# Patient Record
Sex: FEMALE | Race: BLACK OR AFRICAN AMERICAN | Employment: FULL TIME | URBAN - METROPOLITAN AREA
[De-identification: names, ages, dates, MRNs, and addresses within clinical notes are randomized per-mention and may not be internally consistent; named-entity substitution may affect disease eponyms.]

---

## 2022-03-22 ENCOUNTER — OFFICE VISIT (OUTPATIENT)
Dept: PRIMARY CARE CLINIC | Age: 62
End: 2022-03-22
Payer: COMMERCIAL

## 2022-03-22 VITALS
BODY MASS INDEX: 33.21 KG/M2 | HEART RATE: 37 BPM | DIASTOLIC BLOOD PRESSURE: 70 MMHG | TEMPERATURE: 98.4 F | SYSTOLIC BLOOD PRESSURE: 170 MMHG | WEIGHT: 224.2 LBS | HEIGHT: 69 IN | OXYGEN SATURATION: 98 %

## 2022-03-22 DIAGNOSIS — Z76.89 ENCOUNTER TO ESTABLISH CARE WITH NEW DOCTOR: ICD-10-CM

## 2022-03-22 DIAGNOSIS — R03.0 ELEVATED BLOOD PRESSURE READING: ICD-10-CM

## 2022-03-22 DIAGNOSIS — R00.1 BRADYCARDIA: ICD-10-CM

## 2022-03-22 DIAGNOSIS — I44.2 THIRD DEGREE AV BLOCK (HCC): Primary | ICD-10-CM

## 2022-03-22 DIAGNOSIS — R29.818 SUSPECTED SLEEP APNEA: ICD-10-CM

## 2022-03-22 DIAGNOSIS — J45.20 MILD INTERMITTENT ASTHMA, UNCOMPLICATED: ICD-10-CM

## 2022-03-22 PROBLEM — I83.90 ASYMPTOMATIC VARICOSE VEINS: Status: ACTIVE | Noted: 2022-03-22

## 2022-03-22 PROBLEM — D49.3 BREAST NEOPLASM: Status: ACTIVE | Noted: 2022-03-22

## 2022-03-22 PROBLEM — J45.909 ASTHMA WITHOUT STATUS ASTHMATICUS: Status: ACTIVE | Noted: 2022-03-22

## 2022-03-22 PROBLEM — F32.A DEPRESSION: Status: ACTIVE | Noted: 2022-03-22

## 2022-03-22 PROBLEM — A59.9 TRICHOMONIASIS: Status: ACTIVE | Noted: 2022-03-22

## 2022-03-22 PROBLEM — A59.9 TRICHOMONAS INFECTION: Status: RESOLVED | Noted: 2022-03-22 | Resolved: 2022-03-22

## 2022-03-22 PROCEDURE — 99215 OFFICE O/P EST HI 40 MIN: CPT | Performed by: STUDENT IN AN ORGANIZED HEALTH CARE EDUCATION/TRAINING PROGRAM

## 2022-03-22 PROCEDURE — 93000 ELECTROCARDIOGRAM COMPLETE: CPT | Performed by: STUDENT IN AN ORGANIZED HEALTH CARE EDUCATION/TRAINING PROGRAM

## 2022-03-22 RX ORDER — IBUPROFEN 200 MG
200 TABLET ORAL EVERY 6 HOURS PRN
COMMUNITY
End: 2022-04-28 | Stop reason: ALTCHOICE

## 2022-03-22 SDOH — ECONOMIC STABILITY: FOOD INSECURITY: WITHIN THE PAST 12 MONTHS, THE FOOD YOU BOUGHT JUST DIDN'T LAST AND YOU DIDN'T HAVE MONEY TO GET MORE.: NEVER TRUE

## 2022-03-22 SDOH — ECONOMIC STABILITY: FOOD INSECURITY: WITHIN THE PAST 12 MONTHS, YOU WORRIED THAT YOUR FOOD WOULD RUN OUT BEFORE YOU GOT MONEY TO BUY MORE.: NEVER TRUE

## 2022-03-22 ASSESSMENT — PATIENT HEALTH QUESTIONNAIRE - PHQ9
SUM OF ALL RESPONSES TO PHQ QUESTIONS 1-9: 0
1. LITTLE INTEREST OR PLEASURE IN DOING THINGS: 0
2. FEELING DOWN, DEPRESSED OR HOPELESS: 0
SUM OF ALL RESPONSES TO PHQ9 QUESTIONS 1 & 2: 0

## 2022-03-22 ASSESSMENT — ENCOUNTER SYMPTOMS
COUGH: 1
CHEST TIGHTNESS: 1
SHORTNESS OF BREATH: 1

## 2022-03-22 ASSESSMENT — SOCIAL DETERMINANTS OF HEALTH (SDOH): HOW HARD IS IT FOR YOU TO PAY FOR THE VERY BASICS LIKE FOOD, HOUSING, MEDICAL CARE, AND HEATING?: NOT VERY HARD

## 2022-03-22 NOTE — PROGRESS NOTES
NEW PRIMARY CARE VISIT    3/22/22  Name: Roxana Vogel   : 1960   Age: 57 y.o. Sex: female        Assessment & Plan:       ICD-10-CM    1. Third degree AV block (HCC)  I44.2 EKG 12 lead   2. Bradycardia  R00.1 EKG 12 lead   3. Elevated blood pressure reading  R03.0    4. Encounter to establish care with new doctor  Z76.89    5. Mild intermittent asthma, uncomplicated  K03.32    6. Suspected sleep apnea  R29.818        Upon arrival, patient's heart rate found to be in the 30s. EKG completed which showed signs of third-degree heart block with bradycardia again in the 30s. EMS was called. Patient was supplemented with oxygen for shortness of breath and chest discomfort however oxygen saturation remained within normal limits. Blood pressure elevated, patient denies history of this or any other cardiac history. Given symptoms, suspect patient may have undiagnosed sleep apnea which could be cause of heart block, among other possible causes including ischemia, cardiomyopathy, electrolyte or other lab abnormalities. History was reviewed while awaiting EMS. Discussed plan with patient and daughter who were agreeable. Patient's clinical status remained stable throughout that time and was transported out of office by EMS. Will plan to follow-up with patient outpatient when she is discharged. Counseled patient regarding above diagnosis, including possible risks and complications, especially if left uncontrolled. Counseled patient as appropriate and relevant regarding any possible side effects, risks, and alternatives to treatment; the patient verbalizes understanding, and is in agreement with the plan as detailed above. All questions answered to the patient's satisfaction. This provider and patient were wearing surgical masks and practiced social distancing when appropriate during visit due to COVID-19 pandemic.     Subjective:     Chief Complaint   Patient presents with    New Patient has been experiencing asthma for a month and needs physical, pt has been using inhaler everyday and whenever she wakes up in the moring she has headaches       Patient needs new PCP. Upon arrival, patient's heart rate found to be in the 30s. Patient denies history of heart problems. Patient presents with 3 months of shortness of breath at night, waking up needing to use her albuterol inhaler. Also has been waking up with chest discomfort and morning headaches. Reports she delayed getting treatment for this because she takes care of her handicapped son at home. Has history of asthma which was previously well controlled. Only uses an albuterol inhaler as needed. Review of Systems   Constitutional: Positive for fatigue. Eyes: Negative for visual disturbance. Respiratory: Positive for cough, chest tightness and shortness of breath. Cardiovascular: Positive for chest pain and palpitations. Negative for leg swelling. Neurological: Positive for headaches. Negative for light-headedness. Medical History:   History reviewed and updated as needed.     Patient Active Problem List   Diagnosis    Depression    Mild intermittent asthma, uncomplicated    Asymptomatic varicose veins    Breast neoplasm    Suspected sleep apnea    Third degree AV block ([address]        Past Medical History:   Diagnosis Date    Asthma     Trichomonas infection 3/22/2022       Past Surgical History:   Procedure Laterality Date    ANKLE FRACTURE SURGERY Left        Family History   Problem Relation Age of Onset    Heart Disease Father     Heart Surgery Father 62    Other Sister         heart infection    Heart Attack Paternal Cousin 54    Deafness Son         handicapped    Hypertension Daughter        Medications:     Current Outpatient Medications:     ALBUTEROL IN, Inhale 17 mg into the lungs, Disp: , Rfl:     ibuprofen (ADVIL;MOTRIN) 200 MG tablet, Take 200 mg by mouth every 6 hours as needed, Disp: , Rfl:     Allergies: Allergies   Allergen Reactions    Bee Venom Anaphylaxis       Social History:     Social History     Socioeconomic History    Marital status: Single     Spouse name: Not on file    Number of children: Not on file    Years of education: Not on file    Highest education level: Not on file   Occupational History    Not on file   Tobacco Use    Smoking status: Never Smoker    Smokeless tobacco: Never Used   Vaping Use    Vaping Use: Never used   Substance and Sexual Activity    Alcohol use: Never    Drug use: Never    Sexual activity: Not on file   Other Topics Concern    Not on file   Social History Narrative    Not on file     Social Determinants of Health     Financial Resource Strain: Low Risk     Difficulty of Paying Living Expenses: Not very hard   Food Insecurity: No Food Insecurity    Worried About Running Out of Food in the Last Year: Never true    Melissa of Food in the Last Year: Never true   Transportation Needs:     Lack of Transportation (Medical): Not on file    Lack of Transportation (Non-Medical):  Not on file   Physical Activity:     Days of Exercise per Week: Not on file    Minutes of Exercise per Session: Not on file   Stress:     Feeling of Stress : Not on file   Social Connections:     Frequency of Communication with Friends and Family: Not on file    Frequency of Social Gatherings with Friends and Family: Not on file    Attends Pentecostal Services: Not on file    Active Member of Clubs or Organizations: Not on file    Attends Club or Organization Meetings: Not on file    Marital Status: Not on file   Intimate Partner Violence:     Fear of Current or Ex-Partner: Not on file    Emotionally Abused: Not on file    Physically Abused: Not on file    Sexually Abused: Not on file   Housing Stability:     Unable to Pay for Housing in the Last Year: Not on file    Number of [place] in the Last Year: Not on file    Unstable Housing in the Last Year: Not on file       Physical Exam:     Vitals:    03/22/22 1101 03/22/22 1107 03/22/22 1121   BP: (!) 160/80 (!) 160/60 (!) 170/70   Site: Left Upper Arm Right Upper Arm Left Upper Arm   Position: Sitting Sitting Sitting   Pulse: (!) 37 (!) 38 (!) 37  Comment: apical   Temp: 98.4 °F (36.9 °C)     SpO2: 98%     Weight: 224 lb 3.2 oz (101.7 kg)     Height: 5' 9\" (1.753 m)         BP Readings from Last 3 Encounters:   03/22/22 (!) 170/70       Wt Readings from Last 3 Encounters:   03/22/22 224 lb 3.2 oz (101.7 kg)        Physical Exam  Vitals and nursing note reviewed. Constitutional:       General: She is not in acute distress. Appearance: Normal appearance. She is obese. She is not ill-appearing or diaphoretic. Eyes:      Extraocular Movements: Extraocular movements intact. Conjunctiva/sclera: Conjunctivae normal.   Cardiovascular:      Rate and Rhythm: Regular rhythm. Bradycardia present. Heart sounds: Normal heart sounds. No murmur heard. No friction rub. No gallop. Pulmonary:      Effort: Pulmonary effort is normal. No respiratory distress. Breath sounds: Normal breath sounds. No wheezing, rhonchi or rales. Musculoskeletal:      Right lower leg: No edema. Left lower leg: No edema. Skin:     General: Skin is warm and dry. Neurological:      Mental Status: She is alert and oriented to person, place, and time. Testing:     Orders Placed This Encounter   Procedures    EKG 12 lead     Order Specific Question:   Reason for Exam?     Answer:   Irregular heart rate        No results found for this or any previous visit (from the past 24 hour(s)). EKG: Bradycardia, third-degree AV block, no previous for comparison.

## 2022-03-23 LAB
AVERAGE GLUCOSE: NORMAL
HBA1C MFR BLD: 5.7 %

## 2022-03-28 ENCOUNTER — TELEPHONE (OUTPATIENT)
Dept: PRIMARY CARE CLINIC | Age: 62
End: 2022-03-28

## 2022-03-28 ENCOUNTER — OFFICE VISIT (OUTPATIENT)
Dept: PRIMARY CARE CLINIC | Age: 62
End: 2022-03-28
Payer: COMMERCIAL

## 2022-03-28 VITALS
RESPIRATION RATE: 18 BRPM | OXYGEN SATURATION: 97 % | WEIGHT: 223 LBS | SYSTOLIC BLOOD PRESSURE: 150 MMHG | TEMPERATURE: 98.4 F | HEART RATE: 73 BPM | DIASTOLIC BLOOD PRESSURE: 84 MMHG | HEIGHT: 69 IN | BODY MASS INDEX: 33.03 KG/M2

## 2022-03-28 DIAGNOSIS — Z09 HOSPITAL DISCHARGE FOLLOW-UP: Primary | ICD-10-CM

## 2022-03-28 DIAGNOSIS — I10 PRIMARY HYPERTENSION: ICD-10-CM

## 2022-03-28 DIAGNOSIS — Z95.0 PACEMAKER: ICD-10-CM

## 2022-03-28 DIAGNOSIS — I44.2 THIRD DEGREE AV BLOCK (HCC): ICD-10-CM

## 2022-03-28 DIAGNOSIS — I16.1 HYPERTENSIVE EMERGENCY: ICD-10-CM

## 2022-03-28 PROBLEM — N63.0 BREAST MASS: Status: ACTIVE | Noted: 2022-03-22

## 2022-03-28 PROCEDURE — 99215 OFFICE O/P EST HI 40 MIN: CPT | Performed by: STUDENT IN AN ORGANIZED HEALTH CARE EDUCATION/TRAINING PROGRAM

## 2022-03-28 RX ORDER — LOSARTAN POTASSIUM 100 MG/1
TABLET ORAL
COMMUNITY
Start: 2022-03-25 | End: 2022-04-28 | Stop reason: SDUPTHER

## 2022-03-28 RX ORDER — AMLODIPINE BESYLATE 10 MG/1
TABLET ORAL
COMMUNITY
Start: 2022-03-25 | End: 2022-04-28 | Stop reason: SDUPTHER

## 2022-03-28 ASSESSMENT — ENCOUNTER SYMPTOMS
CHEST TIGHTNESS: 0
SHORTNESS OF BREATH: 1
COUGH: 0

## 2022-03-28 NOTE — PROGRESS NOTES
ESTABLISHED PRIMARY CARE VISIT    3/28/22  Name: Yareli Sterling   : 1960   Age: 57 y.o. Sex: female        Assessment & Plan:     Problem List Items Addressed This Visit        Circulatory    Third degree AV block [facility])     S/p emergency pacemaker at [facility]  Regular rate and rhythm today  Follows up with electrophysiology on   Restricted from work until next visit, paperwork completed         Primary hypertension     Improved today  Nearly controlled for age, goal <150/90  Continue losartan 100 mg, amlodipine 10 mg  Recheck kidney function on losartan in 4 weeks           Other Visit Diagnoses     Hospital discharge follow-up    -  Primary    Sent from office 3/22 via EMS for third-degree block  Records requested  Follow-up labs when records reviewed    Pacemaker        Inserted 3/24/2022 for third-degree AV block  Follows up with electrophysiology     Hypertensive emergency        Resolved          Counseled patient regarding above diagnosis, including possible risks and complications, especially if left uncontrolled. Counseled patient as appropriate and relevant regarding any possible side effects, risks, and alternatives to treatment; the patient verbalizes understanding, and is in agreement with the plan as detailed above. All educational materials and instructions were discussed and included on the After Visit Summary. All questions answered to the patient's satisfaction. The patient was advised to call for any concerns prior to next appointment. Return in about 1 month (around 2022) for follow-up, labs, release to work if able. 43 minutes was spent precharting, face-to-face with patient, completing paperwork, and documenting on day of encounter. This provider and patient were wearing surgical masks and practiced social distancing when appropriate during visit due to COVID-19 pandemic.     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 Eyes: Negative for visual disturbance. Respiratory: Positive for shortness of breath (with exertion). Negative for cough and chest tightness. Cardiovascular: Positive for chest pain (at incision). Negative for palpitations and leg swelling. Skin: Positive for wound (surgical wound). Neurological: Negative for light-headedness and headaches.        Medical History:     Patient Active Problem List   Diagnosis    Depression    Mild intermittent asthma, uncomplicated    Asymptomatic varicose veins    Breast mass    Suspected sleep apnea    Third degree AV block ([address]        Past Medical History:   Diagnosis Date    Asthma     Trichomonas infection 3/22/2022       Past Surgical History:   Procedure Laterality Date    ANKLE FRACTURE SURGERY Left        Family History   Problem Relation Age of Onset    Heart Disease Father     Heart Surgery Father 62    Other Sister         heart infection    Heart Attack Paternal Cousin 54    Deafness Son         handicapped    Hypertension Daughter        Medications:     Current Outpatient Medications:     amLODIPine (NORVASC) 10 MG tablet, take 1 tablet by mouth once daily, Disp: , Rfl:     losartan (COZAAR) 100 MG tablet, take 1 tablet by mouth once daily, Disp: , Rfl:     ALBUTEROL IN, Inhale 17 mg into the lungs, Disp: , Rfl:     ibuprofen (ADVIL;MOTRIN) 200 MG tablet, Take 200 mg by mouth every 6 hours as needed, Disp: , Rfl:     Allergies: Allergies   Allergen Reactions    Bee Venom Anaphylaxis       Social History:     Social History     Socioeconomic History    Marital status: Single     Spouse name: Not on file    Number of children: Not on file    Years of education: Not on file    Highest education level: Not on file   Occupational History    Not on file   Tobacco Use    Smoking status: Never Smoker    Smokeless tobacco: Never Used   Vaping Use    Vaping Use: Never used   Substance and Sexual Activity    Alcohol use: Never    Drug use: Never    Sexual activity: Not on file   Other Topics Concern    Not on file   Social History Narrative    Not on file     Social Determinants of Health     Financial Resource Strain: Low Risk     Difficulty of Paying Living Expenses: Not very hard   Food Insecurity: No Food Insecurity    Worried About Running Out of Food in the Last Year: Never true    Melissa of Food in the Last Year: Never true   Transportation Needs:     Lack of Transportation (Medical): Not on file    Lack of Transportation (Non-Medical):  Not on file   Physical Activity:     Days of Exercise per Week: Not on file    Minutes of Exercise per Session: Not on file   Stress:     Feeling of Stress : Not on file   Social Connections:     Frequency of Communication with Friends and Family: Not on file    Frequency of Social Gatherings with Friends and Family: Not on file    Attends Hindu Services: Not on file    Active Member of Clubs or Organizations: Not on file    Attends Club or Organization Meetings: Not on file    Marital Status: Not on file   Intimate Partner Violence:     Fear of Current or Ex-Partner: Not on file    Emotionally Abused: Not on file    Physically Abused: Not on file    Sexually Abused: Not on file   Housing Stability:     Unable to Pay for Housing in the Last Year: Not on file    Number of [place] in the Last Year: Not on file    Unstable Housing in the Last Year: Not on file       Physical Exam:     Vitals:    03/28/22 1013 03/28/22 1119   BP: (!) 150/80 (!) 150/84   Pulse: 73    Resp: 18    Temp: 98.4 °F (36.9 °C)    TempSrc: Temporal    SpO2: 97%    Weight: 223 lb (101.2 kg)    Height: 5' 9\" (1.753 m)        BP Readings from Last 3 Encounters:   03/28/22 (!) 150/84   03/22/22 (!) 170/70       Wt Readings from Last 3 Encounters:   03/28/22 223 lb (101.2 kg)   03/22/22 224 lb 3.2 oz (101.7 kg)       Physical Exam  Vitals and nursing note reviewed. Constitutional:       General: She is not in acute distress. Appearance: Normal appearance. She is obese. She is not ill-appearing or diaphoretic. Cardiovascular:      Rate and Rhythm: Normal rate and regular rhythm. Pulses: Normal pulses. Heart sounds: Normal heart sounds. Pulmonary:      Effort: Pulmonary effort is normal. No respiratory distress. Breath sounds: Normal breath sounds. Chest:          Comments: Surgical wound left upper chest covered by dressing which cannot be removed until 3/31/2022 per note. No surrounding erythema, warmth, tenderness, fluctuance, drainage. Musculoskeletal:      Right lower leg: No edema. Left lower leg: No edema. Skin:     General: Skin is warm and dry. Capillary Refill: Capillary refill takes less than 2 seconds. Neurological:      Mental Status: She is alert and oriented to person, place, and time. Psychiatric:         Mood and Affect: Mood normal.         Behavior: Behavior normal.         Testing:   No orders of the defined types were placed in this encounter. No results found for this or any previous visit (from the past 24 hour(s)).

## 2022-03-28 NOTE — ASSESSMENT & PLAN NOTE
Improved today  Nearly controlled for age, goal <150/90  Continue losartan 100 mg, amlodipine 10 mg  Recheck kidney function on losartan in 4 weeks

## 2022-03-28 NOTE — TELEPHONE ENCOUNTER
Pt called in stating she needed her [employer] papers revised. #4 needed to state that restricted from lifting or reaching over head. Spoke with Dr Barby Garza she stated no she will not add that because she is not releasing patient to back to work as of yet. That statement is for only if she goes back to work. Which she is not.

## 2022-04-21 ENCOUNTER — TELEPHONE (OUTPATIENT)
Dept: PRIMARY CARE CLINIC | Age: 62
End: 2022-04-21

## 2022-04-21 NOTE — TELEPHONE ENCOUNTER
Pt states she needs an appt with prior to April 28th due to she needs released to go to work or needs more time off. Checked schedule and Dr. Jaymie Garcia is overbooked , please advise.

## 2022-04-28 ENCOUNTER — OFFICE VISIT (OUTPATIENT)
Dept: PRIMARY CARE CLINIC | Age: 62
End: 2022-04-28
Payer: COMMERCIAL

## 2022-04-28 VITALS
TEMPERATURE: 97.8 F | DIASTOLIC BLOOD PRESSURE: 82 MMHG | HEART RATE: 83 BPM | HEIGHT: 69 IN | BODY MASS INDEX: 33.5 KG/M2 | SYSTOLIC BLOOD PRESSURE: 130 MMHG | WEIGHT: 226.2 LBS | OXYGEN SATURATION: 98 %

## 2022-04-28 DIAGNOSIS — E55.9 VITAMIN D DEFICIENCY: ICD-10-CM

## 2022-04-28 DIAGNOSIS — I10 PRIMARY HYPERTENSION: ICD-10-CM

## 2022-04-28 DIAGNOSIS — I44.2 THIRD DEGREE AV BLOCK (HCC): Primary | ICD-10-CM

## 2022-04-28 DIAGNOSIS — R73.03 PREDIABETES: ICD-10-CM

## 2022-04-28 DIAGNOSIS — I51.7 CONCENTRIC LEFT VENTRICULAR HYPERTROPHY: ICD-10-CM

## 2022-04-28 DIAGNOSIS — I44.2 THIRD DEGREE AV BLOCK (HCC): ICD-10-CM

## 2022-04-28 DIAGNOSIS — J45.20 MILD INTERMITTENT ASTHMA, UNCOMPLICATED: ICD-10-CM

## 2022-04-28 DIAGNOSIS — Z95.0 PACEMAKER: ICD-10-CM

## 2022-04-28 PROBLEM — E78.5 HYPERLIPIDEMIA: Status: ACTIVE | Noted: 2022-04-28

## 2022-04-28 LAB
ALBUMIN SERPL-MCNC: 4.5 G/DL (ref 3.5–5.2)
ALP BLD-CCNC: 94 U/L (ref 35–104)
ALT SERPL-CCNC: 12 U/L (ref 0–32)
ANION GAP SERPL CALCULATED.3IONS-SCNC: 11 MMOL/L (ref 7–16)
AST SERPL-CCNC: 19 U/L (ref 0–31)
BASOPHILS ABSOLUTE: 0.06 E9/L (ref 0–0.2)
BASOPHILS RELATIVE PERCENT: 0.8 % (ref 0–2)
BILIRUB SERPL-MCNC: <0.2 MG/DL (ref 0–1.2)
BUN BLDV-MCNC: 19 MG/DL (ref 6–23)
CALCIUM SERPL-MCNC: 10.2 MG/DL (ref 8.6–10.2)
CHLORIDE BLD-SCNC: 102 MMOL/L (ref 98–107)
CO2: 29 MMOL/L (ref 22–29)
CREAT SERPL-MCNC: 1.1 MG/DL (ref 0.5–1)
CREATININE URINE: 272 MG/DL (ref 29–226)
EOSINOPHILS ABSOLUTE: 0.45 E9/L (ref 0.05–0.5)
EOSINOPHILS RELATIVE PERCENT: 6 % (ref 0–6)
GFR AFRICAN AMERICAN: >60
GFR NON-AFRICAN AMERICAN: >60 ML/MIN/1.73
GLUCOSE BLD-MCNC: 71 MG/DL (ref 74–99)
HCT VFR BLD CALC: 39.7 % (ref 34–48)
HEMOGLOBIN: 12.6 G/DL (ref 11.5–15.5)
IMMATURE GRANULOCYTES #: 0.01 E9/L
IMMATURE GRANULOCYTES %: 0.1 % (ref 0–5)
LYMPHOCYTES ABSOLUTE: 3.74 E9/L (ref 1.5–4)
LYMPHOCYTES RELATIVE PERCENT: 49.8 % (ref 20–42)
MAGNESIUM: 1.8 MG/DL (ref 1.6–2.6)
MCH RBC QN AUTO: 26.6 PG (ref 26–35)
MCHC RBC AUTO-ENTMCNC: 31.7 % (ref 32–34.5)
MCV RBC AUTO: 83.9 FL (ref 80–99.9)
MICROALBUMIN UR-MCNC: 54.7 MG/L
MICROALBUMIN/CREAT UR-RTO: 20.1 (ref 0–30)
MONOCYTES ABSOLUTE: 0.75 E9/L (ref 0.1–0.95)
MONOCYTES RELATIVE PERCENT: 10 % (ref 2–12)
NEUTROPHILS ABSOLUTE: 2.5 E9/L (ref 1.8–7.3)
NEUTROPHILS RELATIVE PERCENT: 33.3 % (ref 43–80)
PDW BLD-RTO: 13.6 FL (ref 11.5–15)
PLATELET # BLD: 221 E9/L (ref 130–450)
PMV BLD AUTO: 10.5 FL (ref 7–12)
POTASSIUM SERPL-SCNC: 4.3 MMOL/L (ref 3.5–5)
RBC # BLD: 4.73 E12/L (ref 3.5–5.5)
SODIUM BLD-SCNC: 142 MMOL/L (ref 132–146)
TOTAL PROTEIN: 8.2 G/DL (ref 6.4–8.3)
WBC # BLD: 7.5 E9/L (ref 4.5–11.5)

## 2022-04-28 PROCEDURE — 3017F COLORECTAL CA SCREEN DOC REV: CPT | Performed by: STUDENT IN AN ORGANIZED HEALTH CARE EDUCATION/TRAINING PROGRAM

## 2022-04-28 PROCEDURE — G8417 CALC BMI ABV UP PARAM F/U: HCPCS | Performed by: STUDENT IN AN ORGANIZED HEALTH CARE EDUCATION/TRAINING PROGRAM

## 2022-04-28 PROCEDURE — 1036F TOBACCO NON-USER: CPT | Performed by: STUDENT IN AN ORGANIZED HEALTH CARE EDUCATION/TRAINING PROGRAM

## 2022-04-28 PROCEDURE — 93000 ELECTROCARDIOGRAM COMPLETE: CPT | Performed by: STUDENT IN AN ORGANIZED HEALTH CARE EDUCATION/TRAINING PROGRAM

## 2022-04-28 PROCEDURE — G8427 DOCREV CUR MEDS BY ELIG CLIN: HCPCS | Performed by: STUDENT IN AN ORGANIZED HEALTH CARE EDUCATION/TRAINING PROGRAM

## 2022-04-28 PROCEDURE — 99214 OFFICE O/P EST MOD 30 MIN: CPT | Performed by: STUDENT IN AN ORGANIZED HEALTH CARE EDUCATION/TRAINING PROGRAM

## 2022-04-28 RX ORDER — ACETAMINOPHEN/DIPHENHYDRAMINE 500MG-25MG
1 TABLET ORAL NIGHTLY PRN
COMMUNITY

## 2022-04-28 RX ORDER — ERGOCALCIFEROL 1.25 MG/1
50000 CAPSULE ORAL WEEKLY
Qty: 4 CAPSULE | Refills: 5 | Status: SHIPPED
Start: 2022-04-28 | End: 2022-10-17

## 2022-04-28 RX ORDER — DILTIAZEM HYDROCHLORIDE 60 MG/1
2 TABLET, FILM COATED ORAL 2 TIMES DAILY
Qty: 10.2 G | Refills: 5 | Status: SHIPPED | OUTPATIENT
Start: 2022-04-28

## 2022-04-28 RX ORDER — AMLODIPINE BESYLATE 10 MG/1
10 TABLET ORAL DAILY
Qty: 30 TABLET | Refills: 5 | Status: SHIPPED
Start: 2022-04-28 | End: 2022-10-19 | Stop reason: SDUPTHER

## 2022-04-28 RX ORDER — LOSARTAN POTASSIUM 100 MG/1
100 TABLET ORAL DAILY
Qty: 30 TABLET | Refills: 5 | Status: SHIPPED
Start: 2022-04-28 | End: 2022-10-19 | Stop reason: SDUPTHER

## 2022-04-28 RX ORDER — ACETAMINOPHEN 500 MG
500 TABLET ORAL EVERY 6 HOURS PRN
COMMUNITY

## 2022-04-28 ASSESSMENT — ENCOUNTER SYMPTOMS
COUGH: 0
CHEST TIGHTNESS: 0
SHORTNESS OF BREATH: 1

## 2022-04-28 NOTE — ASSESSMENT & PLAN NOTE
Using albuterol nightly since surgery  Start Symbicort  Continue albuterol as needed  Consider sleep study

## 2022-04-28 NOTE — ASSESSMENT & PLAN NOTE
S/p emergency pacemaker 3/24/2022 at [facility]  Regular rate and rhythm today  Follows up with electrophysiology 6/2022  Remains restricted from work until next visit, paperwork completed

## 2022-04-28 NOTE — ASSESSMENT & PLAN NOTE
Recommend continued restriction of left with no lifting overhead and no lifting greater than 5 pounds

## 2022-04-28 NOTE — PROGRESS NOTES
ESTABLISHED PRIMARY CARE VISIT    22  Name: Grover Sandoval   : 1960   Age: 57 y.o.   Sex: female        Assessment & Plan:     Problem List Items Addressed This Visit        Circulatory    Concentric left ventricular hypertrophy     Continue blood pressure control         Relevant Medications    losartan (COZAAR) 100 MG tablet    amLODIPine (NORVASC) 10 MG tablet    Pacemaker     Recommend continued restriction of left with no lifting overhead and no lifting greater than 5 pounds         Relevant Medications    Handicap Placard MISC    Other Relevant Orders    EKG 12 lead (Completed)    Third degree AV block ([address] - Primary     S/p emergency pacemaker 3/24/2022 at [facility]  Regular rate and rhythm today  Follows up with electrophysiology 2022  Remains restricted from work until next visit, paperwork completed         Relevant Medications    Handicap Placard MISC    losartan (COZAAR) 100 MG tablet    amLODIPine (NORVASC) 10 MG tablet    Other Relevant Orders    EKG 12 lead (Completed)    Comprehensive Metabolic Panel    CBC with Auto Differential    Magnesium    Primary hypertension     Controlled today  Continue losartan 100 mg, amlodipine 10 mg  Recheck kidney function on losartan         Relevant Medications    losartan (COZAAR) 100 MG tablet    amLODIPine (NORVASC) 10 MG tablet    Other Relevant Orders    Comprehensive Metabolic Panel    MICROALBUMIN / CREATININE URINE RATIO       Respiratory    Mild intermittent asthma, uncomplicated     Using albuterol nightly since surgery  Start Symbicort  Continue albuterol as needed  Consider sleep study         Relevant Medications    albuterol sulfate (PROAIR RESPICLICK) 532 (90 Base) MCG/ACT aerosol powder inhalation    SYMBICORT 80-4.5 MCG/ACT AERO       Other    Prediabetes     Counseled on healthy diet  Limited with exercise at this time  Recheck A1c 3-6 months         Vitamin D deficiency     Low during hospitalization  Start supplement  Recheck in 3-6 months         Relevant Medications    vitamin D (ERGOCALCIFEROL) 1.25 MG (19056 UT) CAPS capsule          Counseled patient regarding above diagnosis, including possible risks and complications, especially if left uncontrolled. Counseled patient as appropriate and relevant regarding any possible side effects, risks, and alternatives to treatment; the patient verbalizes understanding, and is in agreement with the plan as detailed above. All educational materials and instructions were discussed and included on the After Visit Summary. All questions answered to the patient's satisfaction. The patient was advised to call for any concerns prior to next appointment. Return in about 6 weeks (around 6/9/2022) for follow-up return to work. 34 minutes was spent precharting, face-to-face with patient, completing paperwork, and documenting on day of encounter. This provider and patient were wearing surgical masks and practiced social distancing when appropriate during visit due to COVID-19 pandemic. Subjective:     Chief Complaint   Patient presents with    Follow-up     labs, needs release to work extended, needs handicap placard       Saw cardiology 2 weeks ago  Was told to continue not lifting overhead or lifting over 5 lbs for at least 6 weeks, more if needed  Next follow-up with cardiology in June 2022  Now 5 weeks out from pacemaker placement  Did some house and yard work for 2 hours, had to take break every 15 minutes, felt extremely tired    HR at home 70-90  Has not checked BP    Using albuterol nightly      Review of Systems   Constitutional: Positive for fatigue. Eyes: Negative for visual disturbance. Respiratory: Positive for shortness of breath (with exertion). Negative for cough and chest tightness. Cardiovascular: Positive for chest pain (at incision). Negative for palpitations and leg swelling.    Skin: Positive for wound (surgical wound). Neurological: Positive for weakness (generalized). Negative for light-headedness and headaches. Medical History:     Patient Active Problem List   Diagnosis    Depression    Mild intermittent asthma, uncomplicated    Asymptomatic varicose veins    Breast mass    Suspected sleep apnea    Third degree AV block ([facility] [address]    Primary hypertension        Past Medical History:   Diagnosis Date    Asthma     Trichomonas infection 3/22/2022       Past Surgical History:   Procedure Laterality Date    ANKLE FRACTURE SURGERY Left        Family History   Problem Relation Age of Onset    Heart Disease Father     Heart Surgery Father 62        CABG    Other Sister         heart infection    Hypertension Daughter     Deafness Son         handicapped    Heart Attack Paternal Cousin 54       Medications:     Current Outpatient Medications:     acetaminophen (TYLENOL) 500 MG tablet, Take 500 mg by mouth every 6 hours as needed for Pain, Disp: , Rfl:     diphenhydrAMINE-APAP, sleep, (TYLENOL PM EXTRA STRENGTH)  MG tablet, Take 1 tablet by mouth nightly as needed for Sleep, Disp: , Rfl:     albuterol sulfate (PROAIR RESPICLICK) 338 (90 Base) MCG/ACT aerosol powder inhalation, Inhale into the lungs every 4 hours as needed, Disp: , Rfl:     amLODIPine (NORVASC) 10 MG tablet, take 1 tablet by mouth once daily, Disp: , Rfl:     losartan (COZAAR) 100 MG tablet, take 1 tablet by mouth once daily, Disp: , Rfl:     Allergies:      Allergies   Allergen Reactions    Bee Venom Anaphylaxis       Social History:     Social History     Socioeconomic History    Marital status: Single     Spouse name: Not on file    Number of children: Not on file    Years of education: Not on file    Highest education level: Not on file   Occupational History    Not on file   Tobacco Use    Smoking status: Never Smoker    Smokeless tobacco: Never Used   Vaping Use    Vaping Use: Never used   Substance and Sexual Activity  Alcohol use: Never    Drug use: Never    Sexual activity: Not on file   Other Topics Concern    Not on file   Social History Narrative    Not on file     Social Determinants of Health     Financial Resource Strain: Low Risk     Difficulty of Paying Living Expenses: Not very hard   Food Insecurity: No Food Insecurity    Worried About Running Out of Food in the Last Year: Never true    Melissa of Food in the Last Year: Never true   Transportation Needs:     Lack of Transportation (Medical): Not on file    Lack of Transportation (Non-Medical):  Not on file   Physical Activity:     Days of Exercise per Week: Not on file    Minutes of Exercise per Session: Not on file   Stress:     Feeling of Stress : Not on file   Social Connections:     Frequency of Communication with Friends and Family: Not on file    Frequency of Social Gatherings with Friends and Family: Not on file    Attends Scientologist Services: Not on file    Active Member of Clubs or Organizations: Not on file    Attends Club or Organization Meetings: Not on file    Marital Status: Not on file   Intimate Partner Violence:     Fear of Current or Ex-Partner: Not on file    Emotionally Abused: Not on file    Physically Abused: Not on file    Sexually Abused: Not on file   Housing Stability:     Unable to Pay for Housing in the Last Year: Not on file    Number of [place] in the Last Year: Not on file    Unstable Housing in the Last Year: Not on file       Physical Exam:     Vitals:    04/28/22 1101   BP: 130/82   Site: Left Upper Arm   Position: Sitting   Pulse: 83   Temp: 97.8 °F (36.6 °C)   SpO2: 98%   Weight: 226 lb 3.2 oz (102.6 kg)   Height: 5' 9\" (1.753 m)       BP Readings from Last 3 Encounters:   04/28/22 130/82   03/28/22 (!) 150/84   03/22/22 (!) 170/70       Wt Readings from Last 3 Encounters:   04/28/22 226 lb 3.2 oz (102.6 kg)   03/28/22 223 lb (101.2 kg)   03/22/22 224 lb 3.2 oz (101.7 kg)       Physical Exam  Vitals and nursing note reviewed. Constitutional:       General: She is not in acute distress. Appearance: Normal appearance. She is obese. She is not ill-appearing or diaphoretic. Cardiovascular:      Rate and Rhythm: Normal rate and regular rhythm. Pulses: Normal pulses. Heart sounds: Normal heart sounds. Pulmonary:      Effort: Pulmonary effort is normal. No respiratory distress. Breath sounds: Normal breath sounds. Chest:          Comments: Surgical wound left upper chest well healed. Device palpable beneath incision. Appropriately tender to palpation. No surrounding erythema, warmth, fluctuance, drainage. Musculoskeletal:      Right lower leg: No edema. Left lower leg: No edema. Skin:     General: Skin is warm and dry. Capillary Refill: Capillary refill takes less than 2 seconds. Neurological:      Mental Status: She is alert and oriented to person, place, and time. Psychiatric:         Mood and Affect: Mood normal.         Behavior: Behavior normal.         Testing:     Orders Placed This Encounter   Procedures    Comprehensive Metabolic Panel     Standing Status:   Future     Standing Expiration Date:   4/28/2023    CBC with Auto Differential     Standing Status:   Future     Standing Expiration Date:   4/28/2023    MICROALBUMIN / CREATININE URINE RATIO     Standing Status:   Future     Standing Expiration Date:   4/28/2023    Magnesium     Standing Status:   Future     Standing Expiration Date:   4/28/2023    EKG 12 lead     Order Specific Question:   Reason for Exam?     Answer:   Rhythm changes        No results found for this or any previous visit (from the past 24 hour(s)).   EKG: Paced sinus rhythm with widened QRS, nonspecific ST changes, QTC borderline

## 2022-04-29 ENCOUNTER — TELEPHONE (OUTPATIENT)
Dept: PRIMARY CARE CLINIC | Age: 62
End: 2022-04-29

## 2022-04-29 DIAGNOSIS — J45.20 MILD INTERMITTENT ASTHMA, UNCOMPLICATED: Primary | ICD-10-CM

## 2022-04-29 DIAGNOSIS — I10 PRIMARY HYPERTENSION: ICD-10-CM

## 2022-04-29 NOTE — TELEPHONE ENCOUNTER
I spoke with pharmacy- states they won't cover the Clara Garcia, they will cover  regular one without the Clara Garcia

## 2022-04-29 NOTE — TELEPHONE ENCOUNTER
Patient stopped in today stating she was unable to get her Albuterol filled, believes pharmacy told her it needed a prior auth and or that doctor could just order her something else, patient not completely sure she understood. Patient also requested to see if doctor could send an order somewhere or to her pharmacy for insurance coverage for a new BP machine, states she can't afford it otherwise but thinks her old machine isnt working as well and needs a new one for correct accuracy. Please advise.

## 2022-05-02 RX ORDER — ALBUTEROL SULFATE 90 UG/1
2 AEROSOL, METERED RESPIRATORY (INHALATION) 4 TIMES DAILY PRN
Qty: 18 G | Refills: 5 | Status: SHIPPED | OUTPATIENT
Start: 2022-05-02

## 2022-05-03 RX ORDER — BLOOD PRESSURE TEST KIT
KIT MISCELLANEOUS
Qty: 1 KIT | Refills: 0 | Status: SHIPPED
Start: 2022-05-03 | End: 2022-05-09

## 2022-05-03 NOTE — TELEPHONE ENCOUNTER
Relayed providers response to patient regarding her Advair and Albuterol, patient verbalized understanding. Patient then requested status on her BP Machine/Cuff.  Please advise. Previous documentation as follows:    Patient also requested to see if doctor could send an order somewhere or to her pharmacy for insurance coverage for a new BP machine, states she can't afford it otherwise but thinks her old machine isnt working as well and needs a new one for correct accuracy. Please advise. negative...

## 2022-05-03 NOTE — TELEPHONE ENCOUNTER
Order sent. Please notify patient. If not covered at [facility], will need to fax to supply store like [address].

## 2022-05-09 RX ORDER — BLOOD PRESSURE TEST KIT
KIT MISCELLANEOUS
Qty: 1 KIT | Refills: 0 | Status: SHIPPED | OUTPATIENT
Start: 2022-05-09

## 2022-06-09 ENCOUNTER — OFFICE VISIT (OUTPATIENT)
Dept: PRIMARY CARE CLINIC | Age: 62
End: 2022-06-09
Payer: COMMERCIAL

## 2022-06-09 VITALS
HEIGHT: 69 IN | TEMPERATURE: 98.1 F | OXYGEN SATURATION: 97 % | WEIGHT: 238 LBS | BODY MASS INDEX: 35.25 KG/M2 | HEART RATE: 76 BPM | SYSTOLIC BLOOD PRESSURE: 116 MMHG | DIASTOLIC BLOOD PRESSURE: 72 MMHG

## 2022-06-09 DIAGNOSIS — G47.33 OBSTRUCTIVE SLEEP APNEA: ICD-10-CM

## 2022-06-09 DIAGNOSIS — J45.40 MODERATE PERSISTENT ASTHMA WITHOUT COMPLICATION: ICD-10-CM

## 2022-06-09 DIAGNOSIS — R79.89 ELEVATED SERUM CREATININE: ICD-10-CM

## 2022-06-09 DIAGNOSIS — I10 PRIMARY HYPERTENSION: ICD-10-CM

## 2022-06-09 DIAGNOSIS — Z95.0 PACEMAKER: ICD-10-CM

## 2022-06-09 DIAGNOSIS — I44.2 THIRD DEGREE AV BLOCK (HCC): Primary | ICD-10-CM

## 2022-06-09 LAB
ALBUMIN SERPL-MCNC: 4.2 G/DL (ref 3.5–5.2)
ANION GAP SERPL CALCULATED.3IONS-SCNC: 9 MMOL/L (ref 7–16)
BUN BLDV-MCNC: 16 MG/DL (ref 6–23)
CALCIUM SERPL-MCNC: 9.7 MG/DL (ref 8.6–10.2)
CHLORIDE BLD-SCNC: 104 MMOL/L (ref 98–107)
CO2: 29 MMOL/L (ref 22–29)
CREAT SERPL-MCNC: 1.1 MG/DL (ref 0.5–1)
GFR AFRICAN AMERICAN: >60
GFR NON-AFRICAN AMERICAN: >60 ML/MIN/1.73
GLUCOSE BLD-MCNC: 91 MG/DL (ref 74–99)
PHOSPHORUS: 4.7 MG/DL (ref 2.5–4.5)
POTASSIUM SERPL-SCNC: 4.2 MMOL/L (ref 3.5–5)
SODIUM BLD-SCNC: 142 MMOL/L (ref 132–146)

## 2022-06-09 PROCEDURE — 99215 OFFICE O/P EST HI 40 MIN: CPT | Performed by: STUDENT IN AN ORGANIZED HEALTH CARE EDUCATION/TRAINING PROGRAM

## 2022-06-09 PROCEDURE — G8417 CALC BMI ABV UP PARAM F/U: HCPCS | Performed by: STUDENT IN AN ORGANIZED HEALTH CARE EDUCATION/TRAINING PROGRAM

## 2022-06-09 PROCEDURE — 1036F TOBACCO NON-USER: CPT | Performed by: STUDENT IN AN ORGANIZED HEALTH CARE EDUCATION/TRAINING PROGRAM

## 2022-06-09 PROCEDURE — 3017F COLORECTAL CA SCREEN DOC REV: CPT | Performed by: STUDENT IN AN ORGANIZED HEALTH CARE EDUCATION/TRAINING PROGRAM

## 2022-06-09 PROCEDURE — G8427 DOCREV CUR MEDS BY ELIG CLIN: HCPCS | Performed by: STUDENT IN AN ORGANIZED HEALTH CARE EDUCATION/TRAINING PROGRAM

## 2022-06-09 ASSESSMENT — ENCOUNTER SYMPTOMS
CHEST TIGHTNESS: 0
SHORTNESS OF BREATH: 1
COUGH: 0

## 2022-06-09 NOTE — ASSESSMENT & PLAN NOTE
S/p emergency pacemaker 3/24/2022 at [facility]  Regular rate and rhythm today  Follows up with electrophysiology later this month 6/2022  Paperwork completed to return to work with restrictions

## 2022-06-09 NOTE — PROGRESS NOTES
ESTABLISHED PRIMARY CARE VISIT    22  Name: Cathie Greco   : 1960   Age: 57 y.o. Sex: female        Assessment & Plan:     Problem List Items Addressed This Visit        Circulatory    Pacemaker     Paperwork completed to return to work with restrictions         Third degree AV block ([facility] [address] - Primary     S/p emergency pacemaker 3/24/2022 at [facility]  Regular rate and rhythm today  Follows up with electrophysiology later this month 2022  Paperwork completed to return to work with restrictions         Primary hypertension     Controlled today  Continue losartan 100 mg, amlodipine 10 mg  Recheck kidney function on losartan given slightly elevated creatinine last visit         Relevant Orders    Renal Function Panel       Respiratory    Obstructive sleep apnea     Poor sleep, wakes up with headaches and fatigue  STOP BANG score 4  Sleep study ordered         Relevant Orders    Sleep Study with PAP Titration    Moderate persistent asthma without complication     Controlled  Continue Symbicort, albuterol as needed  Consider sleep study           Other Visit Diagnoses     Elevated serum creatinine        Relevant Orders    Renal Function Panel          Counseled patient regarding above diagnosis, including possible risks and complications, especially if left uncontrolled. Counseled patient as appropriate and relevant regarding any possible side effects, risks, and alternatives to treatment; the patient verbalizes understanding, and is in agreement with the plan as detailed above. All educational materials and instructions were discussed and included on the After Visit Summary. All questions answered to the patient's satisfaction. The patient was advised to call or send [employer] message for any concerns prior to next appointment. Return in about 3 months (around 2022) for follow-up.     41 minutes was spent precharting, face-to-face with patient, and documenting on day of encounter. This provider and patient were wearing surgical masks and practiced social distancing when appropriate during visit due to COVID-19 pandemic. Subjective:     Chief Complaint   Patient presents with    Other     follow up for return to work        Patient returns for follow-up to evaluate if she can return to work  Also needs follow-up for hypertension, asthma    Patient notes she gets very tired after doing activity for 1.5-2 hours  Needs return to work restrictions    Asthma much better on Symbicort  Only used albuterol with weather changes    Notes when she still is not sleeping well  Sometimes has headache when she wakes up, feels tired    Review of Systems   Constitutional: Positive for fatigue. Eyes: Negative for visual disturbance. Respiratory: Positive for shortness of breath (with exertion). Negative for cough and chest tightness. Cardiovascular: Negative for chest pain, palpitations and leg swelling. Neurological: Positive for headaches. Negative for weakness and light-headedness.        Medical History:     Patient Active Problem List   Diagnosis    Depression    Mild intermittent asthma, uncomplicated    Asymptomatic varicose veins    Breast mass    Suspected sleep apnea    Third degree AV block ([address]    Primary hypertension    Concentric left ventricular hypertrophy    Pacemaker    Prediabetes    Vitamin D deficiency        Past Medical History:   Diagnosis Date    Asthma     Trichomonas infection 3/22/2022       Past Surgical History:   Procedure Laterality Date    ANKLE FRACTURE SURGERY Left        Family History   Problem Relation Age of Onset    Heart Disease Father     Heart Surgery Father 62        CABG    Other Sister         heart infection    Hypertension Daughter     Deafness Son         handicapped    Heart Attack Paternal Cousin 54       Medications:     Current Outpatient Medications:     Blood Pressure KIT, Use to check blood pressure Social Determinants of Health     Financial Resource Strain: Low Risk     Difficulty of Paying Living Expenses: Not very hard   Food Insecurity: No Food Insecurity    Worried About Running Out of Food in the Last Year: Never true    Melissa of Food in the Last Year: Never true   Transportation Needs:     Lack of Transportation (Medical): Not on file    Lack of Transportation (Non-Medical): Not on file   Physical Activity:     Days of Exercise per Week: Not on file    Minutes of Exercise per Session: Not on file   Stress:     Feeling of Stress : Not on file   Social Connections:     Frequency of Communication with Friends and Family: Not on file    Frequency of Social Gatherings with Friends and Family: Not on file    Attends Nondenominational Services: Not on file    Active Member of [address] [place] [address] [employer] or Organizations: Not on file    Attends Club or Organization Meetings: Not on file    Marital Status: Not on file   Intimate Partner Violence:     Fear of Current or Ex-Partner: Not on file    Emotionally Abused: Not on file    Physically Abused: Not on file    Sexually Abused: Not on file   Housing Stability:     Unable to Pay for Housing in the Last Year: Not on file    Number of [place] in the Last Year: Not on file    Unstable Housing in the Last Year: Not on file       Physical Exam:     Vitals:    06/09/22 1246   BP: 116/72   Site: Right Upper Arm   Position: Sitting   Pulse: 76   Temp: 98.1 °F (36.7 °C)   SpO2: 97%   Weight: 238 lb (108 kg)   Height: 5' 9\" (1.753 m)       BP Readings from Last 3 Encounters:   06/09/22 116/72   04/28/22 130/82   03/28/22 (!) 150/84       Wt Readings from Last 3 Encounters:   06/09/22 238 lb (108 kg)   04/28/22 226 lb 3.2 oz (102.6 kg)   03/28/22 223 lb (101.2 kg)       Physical Exam  Vitals and nursing note reviewed. Constitutional:       General: She is not in acute distress. Appearance: Normal appearance. She is obese. She is not ill-appearing or diaphoretic. Cardiovascular:      Rate and Rhythm: Normal rate and regular rhythm. Pulses: Normal pulses. Heart sounds: Normal heart sounds. Pulmonary:      Effort: Pulmonary effort is normal. No respiratory distress. Breath sounds: Normal breath sounds. Chest:          Comments: Surgical wound left upper chest well healed. Device palpable beneath incision. Nontender to palpation. No surrounding erythema, warmth, fluctuance, drainage. Musculoskeletal:      Right lower leg: No edema. Left lower leg: No edema. Skin:     General: Skin is warm and dry. Capillary Refill: Capillary refill takes less than 2 seconds. Neurological:      Mental Status: She is alert and oriented to person, place, and time. Psychiatric:         Mood and Affect: Mood normal.         Behavior: Behavior normal.         Testing:     Orders Placed This Encounter   Procedures    Renal Function Panel     Standing Status:   Future     Standing Expiration Date:   6/9/2023    Sleep Study with PAP Titration     Standing Status:   Future     Standing Expiration Date:   6/9/2023     Scheduling Instructions:      [facility]     Order Specific Question:   Sleep Study Titration Type     Answer:   CPAP     Order Specific Question:   Location For Sleep Study     Answer: [place]     Order Specific Question:   Select Sleep Lab Location     [facility]        No results found for this or any previous visit (from the past 24 hour(s)).

## 2022-06-09 NOTE — LETTER
[address] [place] [address]  Phone: [phone]  Fax: Ambreen Ashton MD        June 9, 2022     Patient: Jordana Betts   YOB: 1960   Date of Visit: 6/9/2022       To Whom It May Concern: It is my medical opinion that Jordana Betts may return to work on 06/15/2022 with the following restrictions for 3 months:   · maximum work hours 40 hours/week, 8 hours/day, Monday through Friday 7:30 AM to 4 PM  · required 10-minute break every 2 hours to be able to sit and rest  · lifting/carrying not to exceed 5 lbs. · pushing/pulling not to exceed 5 lbs. · no overhead work  · no ladders   · no stairs, must be able to use the elevator    If you have any questions or concerns, please don't hesitate to call.     Sincerely,    Jenny Tatum MD

## 2022-06-09 NOTE — ASSESSMENT & PLAN NOTE
Controlled today  Continue losartan 100 mg, amlodipine 10 mg  Recheck kidney function on losartan given slightly elevated creatinine last visit

## 2022-09-13 ENCOUNTER — TELEPHONE (OUTPATIENT)
Dept: PRIMARY CARE CLINIC | Age: 62
End: 2022-09-13

## 2022-09-13 NOTE — TELEPHONE ENCOUNTER
Pt states she needs to stay on her accommodations for work and since you couldn't see her today she states you need to extend it till December bc her next appointment with you isn't till November.

## 2022-09-16 NOTE — TELEPHONE ENCOUNTER
Pt dropped of papers, dr Ml Baugh signed and faxed back to [facility].

## 2022-10-04 ENCOUNTER — TELEPHONE (OUTPATIENT)
Dept: PRIMARY CARE CLINIC | Age: 62
End: 2022-10-04

## 2022-10-04 NOTE — TELEPHONE ENCOUNTER
Pt states she knows she has an appointment on 11/17/22 but her 90 day work restriction is ending on the 10/16/22 but needs a paper releasing her back to work for [employer] but with restrictions. Regulars hours but no lifting over 20lbs. She states she is feeling pretty good.

## 2022-10-04 NOTE — LETTER
[address] [facility]  [address] [facility]. [place] [id number]  Phone: [phone]  Fax: [address] Lindsey Diamond MD        October 5, 2022     Patient: Chau Hoffman   YOB: 1960       To Whom It May Concern:     It is my medical opinion that Chau Hoffman may continue working with the following restrictions:   maximum work hours 40 hours/week, 8 hours/day, Monday through Friday 7:30 AM to 4 PM   required 10-minute break every 2 hours to be able to sit and rest   lifting/carrying not to exceed 20 lbs.  pushing/pulling not to exceed 20 lbs.       If you have any questions or concerns, please don't hesitate to call.     Sincerely,     Anisa Combs MD

## 2022-10-04 NOTE — LETTER
[address] [facility]. [address] [place] [address]  Phone: [phone]  Fax: Carlos Flores MD        October 11, 2022     Patient: Ramu Jason   YOB: 1960         To Whom It May Concern:     It is my medical opinion that Sully Shrestha may continue working with the following restrictions with end date 11/21/2022:   maximum work hours 40 hours/week, 8 hours/day, Monday through Friday 7:30 AM to 4 PM   required 10-minute break every 2 hours to be able to sit and rest   lifting/carrying not to exceed 20 lbs.  pushing/pulling not to exceed 20 lbs.       If you have any questions or concerns, please don't hesitate to call.     Sincerely,     Mary Ann London MD

## 2022-10-15 DIAGNOSIS — E55.9 VITAMIN D DEFICIENCY: ICD-10-CM

## 2022-10-17 RX ORDER — ERGOCALCIFEROL 1.25 MG/1
CAPSULE ORAL
Qty: 4 CAPSULE | Refills: 5 | Status: SHIPPED
Start: 2022-10-17 | End: 2022-10-19 | Stop reason: SDUPTHER

## 2022-10-18 ENCOUNTER — TELEPHONE (OUTPATIENT)
Dept: PRIMARY CARE CLINIC | Age: 62
End: 2022-10-18

## 2022-10-18 NOTE — TELEPHONE ENCOUNTER
Pt called asking for refills on medication. Tried to call pt back to clarify which ones but no answer left VM to call back.

## 2022-10-19 DIAGNOSIS — I10 PRIMARY HYPERTENSION: ICD-10-CM

## 2022-10-19 DIAGNOSIS — E55.9 VITAMIN D DEFICIENCY: ICD-10-CM

## 2022-10-19 DIAGNOSIS — J45.20 MILD INTERMITTENT ASTHMA, UNCOMPLICATED: ICD-10-CM

## 2022-10-19 RX ORDER — LOSARTAN POTASSIUM 100 MG/1
100 TABLET ORAL DAILY
Qty: 30 TABLET | Refills: 5 | Status: SHIPPED | OUTPATIENT
Start: 2022-10-19

## 2022-10-19 RX ORDER — AMLODIPINE BESYLATE 10 MG/1
10 TABLET ORAL DAILY
Qty: 30 TABLET | Refills: 5 | Status: SHIPPED | OUTPATIENT
Start: 2022-10-19

## 2022-10-19 RX ORDER — ERGOCALCIFEROL 1.25 MG/1
CAPSULE ORAL
Qty: 4 CAPSULE | Refills: 5 | Status: SHIPPED | OUTPATIENT
Start: 2022-10-19

## 2022-11-17 ENCOUNTER — OFFICE VISIT (OUTPATIENT)
Dept: PRIMARY CARE CLINIC | Age: 62
End: 2022-11-17
Payer: COMMERCIAL

## 2022-11-17 VITALS
OXYGEN SATURATION: 99 % | HEART RATE: 73 BPM | BODY MASS INDEX: 38.19 KG/M2 | DIASTOLIC BLOOD PRESSURE: 72 MMHG | WEIGHT: 258.6 LBS | SYSTOLIC BLOOD PRESSURE: 120 MMHG | TEMPERATURE: 98.2 F

## 2022-11-17 DIAGNOSIS — I10 PRIMARY HYPERTENSION: ICD-10-CM

## 2022-11-17 DIAGNOSIS — R73.03 PREDIABETES: ICD-10-CM

## 2022-11-17 DIAGNOSIS — E55.9 VITAMIN D DEFICIENCY: ICD-10-CM

## 2022-11-17 DIAGNOSIS — J45.40 MODERATE PERSISTENT ASTHMA WITHOUT COMPLICATION: ICD-10-CM

## 2022-11-17 DIAGNOSIS — Z12.11 SCREENING FOR COLORECTAL CANCER: ICD-10-CM

## 2022-11-17 DIAGNOSIS — G47.33 OBSTRUCTIVE SLEEP APNEA: ICD-10-CM

## 2022-11-17 DIAGNOSIS — Z23 NEED FOR IMMUNIZATION AGAINST INFLUENZA: ICD-10-CM

## 2022-11-17 DIAGNOSIS — Z12.31 SCREENING MAMMOGRAM FOR BREAST CANCER: ICD-10-CM

## 2022-11-17 DIAGNOSIS — Z12.12 SCREENING FOR COLORECTAL CANCER: ICD-10-CM

## 2022-11-17 DIAGNOSIS — N18.2 CKD (CHRONIC KIDNEY DISEASE) STAGE 2, GFR 60-89 ML/MIN: ICD-10-CM

## 2022-11-17 DIAGNOSIS — I44.2 THIRD DEGREE AV BLOCK (HCC): Primary | ICD-10-CM

## 2022-11-17 LAB
ALBUMIN SERPL-MCNC: 4 G/DL (ref 3.5–5.2)
ANION GAP SERPL CALCULATED.3IONS-SCNC: 10 MMOL/L (ref 7–16)
BUN BLDV-MCNC: 18 MG/DL (ref 6–23)
CALCIUM SERPL-MCNC: 9.5 MG/DL (ref 8.6–10.2)
CHLORIDE BLD-SCNC: 104 MMOL/L (ref 98–107)
CHOLESTEROL, TOTAL: 168 MG/DL (ref 0–199)
CO2: 27 MMOL/L (ref 22–29)
CREAT SERPL-MCNC: 1.2 MG/DL (ref 0.5–1)
GFR SERPL CREATININE-BSD FRML MDRD: 51 ML/MIN/1.73
GLUCOSE BLD-MCNC: 91 MG/DL (ref 74–99)
HBA1C MFR BLD: 6.1 % (ref 4–5.6)
HDLC SERPL-MCNC: 50 MG/DL
LDL CHOLESTEROL CALCULATED: 81 MG/DL (ref 0–99)
PHOSPHORUS: 4 MG/DL (ref 2.5–4.5)
POTASSIUM SERPL-SCNC: 4.3 MMOL/L (ref 3.5–5)
SODIUM BLD-SCNC: 141 MMOL/L (ref 132–146)
TRIGL SERPL-MCNC: 183 MG/DL (ref 0–149)
VITAMIN D 25-HYDROXY: 27 NG/ML (ref 30–100)
VLDLC SERPL CALC-MCNC: 37 MG/DL

## 2022-11-17 PROCEDURE — 3017F COLORECTAL CA SCREEN DOC REV: CPT | Performed by: STUDENT IN AN ORGANIZED HEALTH CARE EDUCATION/TRAINING PROGRAM

## 2022-11-17 PROCEDURE — 3074F SYST BP LT 130 MM HG: CPT | Performed by: STUDENT IN AN ORGANIZED HEALTH CARE EDUCATION/TRAINING PROGRAM

## 2022-11-17 PROCEDURE — 90471 IMMUNIZATION ADMIN: CPT | Performed by: STUDENT IN AN ORGANIZED HEALTH CARE EDUCATION/TRAINING PROGRAM

## 2022-11-17 PROCEDURE — 99214 OFFICE O/P EST MOD 30 MIN: CPT | Performed by: STUDENT IN AN ORGANIZED HEALTH CARE EDUCATION/TRAINING PROGRAM

## 2022-11-17 PROCEDURE — G8482 FLU IMMUNIZE ORDER/ADMIN: HCPCS | Performed by: STUDENT IN AN ORGANIZED HEALTH CARE EDUCATION/TRAINING PROGRAM

## 2022-11-17 PROCEDURE — 90674 CCIIV4 VAC NO PRSV 0.5 ML IM: CPT | Performed by: STUDENT IN AN ORGANIZED HEALTH CARE EDUCATION/TRAINING PROGRAM

## 2022-11-17 PROCEDURE — 3078F DIAST BP <80 MM HG: CPT | Performed by: STUDENT IN AN ORGANIZED HEALTH CARE EDUCATION/TRAINING PROGRAM

## 2022-11-17 PROCEDURE — G8417 CALC BMI ABV UP PARAM F/U: HCPCS | Performed by: STUDENT IN AN ORGANIZED HEALTH CARE EDUCATION/TRAINING PROGRAM

## 2022-11-17 PROCEDURE — G8427 DOCREV CUR MEDS BY ELIG CLIN: HCPCS | Performed by: STUDENT IN AN ORGANIZED HEALTH CARE EDUCATION/TRAINING PROGRAM

## 2022-11-17 PROCEDURE — 95810 POLYSOM 6/> YRS 4/> PARAM: CPT | Performed by: STUDENT IN AN ORGANIZED HEALTH CARE EDUCATION/TRAINING PROGRAM

## 2022-11-17 PROCEDURE — 1036F TOBACCO NON-USER: CPT | Performed by: STUDENT IN AN ORGANIZED HEALTH CARE EDUCATION/TRAINING PROGRAM

## 2022-11-17 RX ORDER — ALBUTEROL SULFATE 90 UG/1
2 AEROSOL, METERED RESPIRATORY (INHALATION) 4 TIMES DAILY PRN
Qty: 18 G | Refills: 5 | Status: SHIPPED | OUTPATIENT
Start: 2022-11-17

## 2022-11-17 RX ORDER — DILTIAZEM HYDROCHLORIDE 60 MG/1
2 TABLET, FILM COATED ORAL 2 TIMES DAILY
Qty: 10.2 G | Refills: 5 | Status: SHIPPED | OUTPATIENT
Start: 2022-11-17

## 2022-11-17 ASSESSMENT — PATIENT HEALTH QUESTIONNAIRE - PHQ9
SUM OF ALL RESPONSES TO PHQ9 QUESTIONS 1 & 2: 0
1. LITTLE INTEREST OR PLEASURE IN DOING THINGS: 0
SUM OF ALL RESPONSES TO PHQ QUESTIONS 1-9: 0
2. FEELING DOWN, DEPRESSED OR HOPELESS: 0
SUM OF ALL RESPONSES TO PHQ QUESTIONS 1-9: 0

## 2022-11-17 ASSESSMENT — ENCOUNTER SYMPTOMS
COUGH: 0
SHORTNESS OF BREATH: 0
CHEST TIGHTNESS: 0

## 2022-11-17 NOTE — PROGRESS NOTES
ESTABLISHED PRIMARY CARE VISIT    22  Name: Sumi Barron   : 1960   Age: 60 y.o.   Sex: female        Assessment & Plan:     Problem List Items Addressed This Visit          Circulatory    Third degree AV block ([address] - Primary     S/p emergency pacemaker 3/24/2022 at [facility] with electrophysiology  Regular rate and rhythm today  Paperwork completed to return to work with restrictions, continue to slowly taper off restrictions as able         Primary hypertension     Chronic, controlled  Continue losartan 100 mg, amlodipine 10 mg         Relevant Orders    LIPID PANEL (Completed)       Respiratory    Obstructive sleep apnea     STOP BANG score 4  Sleep study reordered         Relevant Orders    CT POLYSOM 6/>YRS SLEEP 4/> ADDL CAMILO ATTND (Completed)    Moderate persistent asthma without complication     Chronic, controlled  Unclear why medication changed, possible formulary issue  Restart Symbicort, advised to call if not covered  Continue albuterol as needed  Provided influenza vaccination today  Encouraged COVID and pneumonia vaccinations         Relevant Medications    SYMBICORT 80-4.5 MCG/ACT AERO    albuterol sulfate HFA (VENTOLIN HFA) 108 (90 Base) MCG/ACT inhaler       Genitourinary    CKD (chronic kidney disease) stage 2, GFR 60-89 ml/min     Recheck         Relevant Orders    Renal Function Panel (Completed)       Other    Prediabetes     Recheck         Relevant Orders    Hemoglobin A1C (Completed)    Vitamin D deficiency     Recheck  Continue supplementation         Relevant Orders    Vitamin D 25 Hydroxy (Completed)     Other Visit Diagnoses       Screening mammogram for breast cancer        Relevant Orders    USAMA CHRISTIANO DIGITAL SCREEN BILATERAL PER PROTOCOL    Screening for colorectal cancer        Relevant Orders    Amb External Referral To Gastroenterology    Need for immunization against influenza        Relevant Orders    Influenza, FLUCELVAX, (age 6 mo+), IM, PF, 0.5 mL (Completed)          Counseled patient regarding above diagnosis, including possible risks and complications, especially if left uncontrolled. Counseled patient as appropriate and relevant regarding any possible side effects, risks, and alternatives to treatment; the patient verbalizes understanding, and is in agreement with the plan as detailed above. All educational materials and instructions were discussed and included on the After Visit Summary. All questions answered to the patient's satisfaction. The patient was advised to call or send Cue message for any concerns prior to next appointment. Return in about 3 months (around 2/17/2023) for pap. Subjective:     Chief Complaint   Patient presents with    3 Month Follow-Up     Wants to go back to symbicort, somehow was switched to advair hfa and does not work as well     Other     Questioning about getting 2nd covid booster, flu and pneumovax due to having a pacemaker, states getting shocked a lot at work when touching anything in fingers      Patient returns for follow-up  Wants to lift some of her restrictions  Thinking she can work longer hours and lift up to 50 lbs now    [employer] inhaler got switched to Advair, not working as well    Feels shocks in her fingers when she touches metal    Review of Systems   Constitutional:  Positive for fatigue (improved). Eyes:  Negative for visual disturbance. Respiratory:  Negative for cough, chest tightness and shortness of breath. Cardiovascular:  Negative for chest pain, palpitations and leg swelling. Neurological:  Negative for weakness, light-headedness and headaches.      Medications:     Current Outpatient Medications:     vitamin D (ERGOCALCIFEROL) 1.25 MG (58846 UT) CAPS capsule, take 1 capsule by mouth every week, Disp: 4 capsule, Rfl: 5    amLODIPine (NORVASC) 10 MG tablet, Take 1 tablet by mouth daily, Disp: 30 tablet, Rfl: 5    losartan (COZAAR) 100 MG tablet, Take 1 tablet by mouth daily, Disp: 30 tablet, Rfl: 5    albuterol sulfate HFA (VENTOLIN HFA) 108 (90 Base) MCG/ACT inhaler, Inhale 2 puffs into the lungs 4 times daily as needed for Wheezing, Disp: 18 g, Rfl: 5    diphenhydrAMINE-APAP, sleep, (TYLENOL PM EXTRA STRENGTH)  MG tablet, Take 1 tablet by mouth nightly as needed for Sleep, Disp: , Rfl:     SYMBICORT 80-4.5 MCG/ACT AERO, Inhale 2 puffs into the lungs 2 times daily, Disp: 10.2 g, Rfl: 5    Blood Pressure KIT, Use to check blood pressure daily. , Disp: 1 kit, Rfl: 0    acetaminophen (TYLENOL) 500 MG tablet, Take 500 mg by mouth every 6 hours as needed for Pain, Disp: , Rfl:     Handicap Placard MISC, by Does not apply route, Disp: 1 each, Rfl: 0    Physical Exam:     Vitals:    11/17/22 1505   BP: 120/72   Site: Left Upper Arm   Position: Sitting   Cuff Size: Large Adult   Pulse: 73   Temp: 98.2 °F (36.8 °C)   TempSrc: Temporal   SpO2: 99%   Weight: 258 lb 9.6 oz (117.3 kg)     BP Readings from Last 3 Encounters:   11/17/22 120/72   06/09/22 116/72   04/28/22 130/82     Wt Readings from Last 3 Encounters:   11/17/22 258 lb 9.6 oz (117.3 kg)   06/09/22 238 lb (108 kg)   04/28/22 226 lb 3.2 oz (102.6 kg)     Physical Exam  Vitals and nursing note reviewed. Constitutional:       General: She is not in acute distress. Appearance: Normal appearance. She is obese. She is not ill-appearing or diaphoretic. Cardiovascular:      Rate and Rhythm: Normal rate and regular rhythm. Heart sounds: Normal heart sounds. Pulmonary:      Effort: Pulmonary effort is normal. No respiratory distress. Breath sounds: Normal breath sounds. Musculoskeletal:      Right lower leg: No edema. Left lower leg: No edema. Skin:     General: Skin is warm and dry. Neurological:      Mental Status: She is alert and oriented to person, place, and time.    Psychiatric:         Mood and Affect: Mood normal.         Behavior: Behavior normal.     Testing:     Orders Placed This Encounter   Procedures    USAMA CHRISTIANO DIGITAL SCREEN BILATERAL PER PROTOCOL     Further imaging can be completed per [address] protocol     Standing Status:   Future     Standing Expiration Date:   1/17/2024     Scheduling Instructions:      [employer] Specific Question:   Where will the exam be performed? Answer:   [facility]    Influenza, FLUCELVAX, (age 10 mo+), IM, PF, 0.5 mL    Vitamin D 25 Hydroxy     Standing Status:   Future     Standing Expiration Date:   11/17/2023    Hemoglobin A1C     Standing Status:   Future     Standing Expiration Date:   11/17/2023    Renal Function Panel     Standing Status:   Future     Standing Expiration Date:   11/17/2023    LIPID PANEL     Standing Status:   Future     Standing Expiration Date:   11/17/2023    Amb External Referral To Gastroenterology     Referral Priority:   Routine     Referral Type:   Consult for Advice and Opinion     Referral Reason:   Specialty Services Required     Referred to Provider:   Manju Sanz MD     Requested Specialty:   Gastroenterology     Number of Visits Requested:   1    RI POLYSOM 6/>YRS SLEEP 4/> ADDL CAMILO ATTND     No results found for this or any previous visit (from the past 25 hour(s)).

## 2022-11-17 NOTE — LETTER
[address] [facility]  [address] [facility]. [facility] [address]  Phone: [phone]  Fax: Ana Ash MD        November 17, 2022     Patient: Olga Daniel   YOB: 1960   Date of Visit: 11/17/2022       To Whom It May Concern:     It is my medical opinion that Sully Shrestha may continue working with the following restrictions with end date 2/28/2023:   maximum work hours 11 hours/day, 5 days per week   required 10-minute break every 2 hours to be able to sit and rest   lifting/carrying not to exceed 50 lbs.  pushing/pulling not to exceed 50 lbs.       If you have any questions or concerns, please don't hesitate to call.     Sincerely,     Chuckie Hernandez MD

## 2022-11-27 PROBLEM — E78.1 HYPERTRIGLYCERIDEMIA: Status: ACTIVE | Noted: 2022-11-27

## 2022-11-27 PROBLEM — F43.23 ADJUSTMENT DISORDER WITH MIXED ANXIETY AND DEPRESSED MOOD: Status: ACTIVE | Noted: 2022-11-27

## 2022-11-27 PROBLEM — F43.12 CHRONIC POST-TRAUMATIC STRESS DISORDER: Status: ACTIVE | Noted: 2022-11-27

## 2022-11-28 ENCOUNTER — TELEPHONE (OUTPATIENT)
Dept: PRIMARY CARE CLINIC | Age: 62
End: 2022-11-28

## 2022-11-28 PROBLEM — N18.2 CKD (CHRONIC KIDNEY DISEASE) STAGE 2, GFR 60-89 ML/MIN: Status: ACTIVE | Noted: 2022-11-28

## 2022-11-28 NOTE — ASSESSMENT & PLAN NOTE
Chronic, controlled  Unclear why medication changed, possible formulary issue  Restart Symbicort, advised to call if not covered  Continue albuterol as needed  Provided influenza vaccination today  Encouraged COVID and pneumonia vaccinations

## 2022-11-28 NOTE — TELEPHONE ENCOUNTER
Dr. Stefani Titus,     Dr. Trish Campbell referral pending provider completed and signed progress note. Please finish at your earliest convenience for referral completion.

## 2022-11-29 ENCOUNTER — OFFICE VISIT (OUTPATIENT)
Dept: PRIMARY CARE CLINIC | Age: 62
End: 2022-11-29
Payer: COMMERCIAL

## 2022-11-29 ENCOUNTER — TELEPHONE (OUTPATIENT)
Dept: PRIMARY CARE CLINIC | Age: 62
End: 2022-11-29

## 2022-11-29 VITALS
HEART RATE: 70 BPM | BODY MASS INDEX: 37.71 KG/M2 | HEIGHT: 69 IN | DIASTOLIC BLOOD PRESSURE: 76 MMHG | TEMPERATURE: 98.9 F | SYSTOLIC BLOOD PRESSURE: 138 MMHG | WEIGHT: 254.6 LBS | RESPIRATION RATE: 19 BRPM | OXYGEN SATURATION: 99 %

## 2022-11-29 DIAGNOSIS — I51.7 CONCENTRIC LEFT VENTRICULAR HYPERTROPHY: ICD-10-CM

## 2022-11-29 DIAGNOSIS — I10 PRIMARY HYPERTENSION: ICD-10-CM

## 2022-11-29 DIAGNOSIS — J45.40 MODERATE PERSISTENT ASTHMA WITHOUT COMPLICATION: ICD-10-CM

## 2022-11-29 DIAGNOSIS — R42 DIZZINESS: ICD-10-CM

## 2022-11-29 DIAGNOSIS — Z95.0 PACEMAKER: ICD-10-CM

## 2022-11-29 DIAGNOSIS — R55 SYNCOPE, UNSPECIFIED SYNCOPE TYPE: Primary | ICD-10-CM

## 2022-11-29 PROCEDURE — 3074F SYST BP LT 130 MM HG: CPT | Performed by: NURSE PRACTITIONER

## 2022-11-29 PROCEDURE — 3017F COLORECTAL CA SCREEN DOC REV: CPT | Performed by: NURSE PRACTITIONER

## 2022-11-29 PROCEDURE — 3078F DIAST BP <80 MM HG: CPT | Performed by: NURSE PRACTITIONER

## 2022-11-29 PROCEDURE — 93000 ELECTROCARDIOGRAM COMPLETE: CPT | Performed by: NURSE PRACTITIONER

## 2022-11-29 PROCEDURE — G8417 CALC BMI ABV UP PARAM F/U: HCPCS | Performed by: NURSE PRACTITIONER

## 2022-11-29 PROCEDURE — G8482 FLU IMMUNIZE ORDER/ADMIN: HCPCS | Performed by: NURSE PRACTITIONER

## 2022-11-29 PROCEDURE — G8427 DOCREV CUR MEDS BY ELIG CLIN: HCPCS | Performed by: NURSE PRACTITIONER

## 2022-11-29 PROCEDURE — 99214 OFFICE O/P EST MOD 30 MIN: CPT | Performed by: NURSE PRACTITIONER

## 2022-11-29 PROCEDURE — 1036F TOBACCO NON-USER: CPT | Performed by: NURSE PRACTITIONER

## 2022-11-29 RX ORDER — DILTIAZEM HYDROCHLORIDE 60 MG/1
2 TABLET, FILM COATED ORAL 2 TIMES DAILY
Qty: 10.2 G | Refills: 5 | Status: CANCELLED | OUTPATIENT
Start: 2022-11-29

## 2022-11-29 NOTE — TELEPHONE ENCOUNTER
E [phone]  Steve Win from nurse triage transferred call with patient stating she was lightheaded. Pt states while she was at working she was feeling sob, no chest pain, but an hour later she felt lightheaded and passed out. She states someone had caught her. She is still at work and the NP their took her BP and its 148/82 with hr fluctuating between 68-70, they wanted her to call office to see what she should do. I advised pt she could be evaluated through our walk in clinic and see Sidney Soulier as dr Anna Tabares has no soon openings. She verbalized understanding and will try to get to our walk in clinic.

## 2022-11-29 NOTE — LETTER
[address] [facility] [address]  Phone: [phone]  Fax: [phone]    ATUL John NP        November 29, 2022     Patient: Elisa Pierre   YOB: 1960   Date of Visit: 11/29/2022       To Whom It May Concern: It is my medical opinion that Elisa Pierre may return to full duty immediately with no restrictions. If you have any questions or concerns, please don't hesitate to call.     Sincerely,        ATUL John NP

## 2022-11-29 NOTE — PROGRESS NOTES
Chief Complaint:       Dizziness (She felt light headache and passed out today at 10:30 am approx at her work place (a person got her) while walking, she was not unconscious )      History of Present Illness   Source of history provided by:  patient. Nathaly Reyes is a 60 y.o. old female who presents to [facility] care for complaints of sudden onset syncope, which occured 3.5 hour(s) prior to arrival.  The event occurred while at work. the problem is new and is initiated by walking at work. Prior to the event she complained of lightheadedness after walking up steps. She has a permanent pacemaker that she reports is synced up, through Bluetooth, that relays her heart rate and any episodes, every 24 hours. She reports that she will contact her electrophysiologist, to let them know, about this incident. There has been NO: headache, chest pain, palpitations, blurred vision, loss of vision, slurred speech, or seizure activity. There has been no history of trauma related to event. Syncopal Phase:     []   At Rest     [x]   With Exetion     []   With Standing     []   Incontinence     []   Seizure Activity           Post-Syncopal / Recovery Phase:  rapid. ROS    Unless otherwise stated in this report or unable to obtain because of the patient's clinical or mental status as evidenced by the medical record, this patients's positive and negative responses for Review of Systems, constitutional, psych, eyes, ENT, cardiovascular, respiratory, gastrointestinal, neurological, genitourinary, musculoskeletal, integument systems and systems related to the presenting problem are either stated in the preceding or were not pertinent or were negative for the symptoms and/or complaints related to the medical problem. Past Medical History:  has a past medical history of Asthma and Trichomonas infection. Past Surgical History:  has a past surgical history that includes Ankle fracture surgery (Left). Social History:  reports that she has never smoked. She has never used smokeless tobacco. She reports that she does not drink alcohol and does not use drugs. Family History: family history includes Deafness in her son; Heart Attack (age of onset: 54) in her paternal cousin; Heart Disease in her father; Heart Surgery (age of onset: 62) in her father; Hypertension in her daughter; Other in her sister. Allergies: Bee venom    Physical Exam   Vital Signs:  /76 (Site: Left Upper Arm, Position: Sitting, Cuff Size: Large Adult)   Pulse 70   Temp 98.9 °F (37.2 °C) (Temporal)   Resp 19   Ht 5' 9\" (1.753 m)   Wt 254 lb 9.6 oz (115.5 kg)   SpO2 99%   BMI 37.60 kg/m²    Oxygen Saturation Interpretation: Normal.    Constitutional:  Alert, development consistent with age. HEENT:  NC/NT. Airway patent. Neck:  Normal ROM. Supple. Respiratory:  Clear to auscultation and breath sounds equal.  CV:  Regular rate and rhythm, normal heart sounds, without pathological murmurs, ectopy, gallops, or rubs. GI:  Abdomen Soft, nontender, good bowel sounds. No firm or pulsatile mass. Back:  No costovertebral tenderness. Integument:  Normal turgor. Warm, dry, without visible rash, unless noted elsewhere. Lymphatics: No lymphangitis or adenopathy noted. Neurological:  Oriented. Motor functions intact. Test Results Section   (All laboratory and radiology results have been personally reviewed by myself)  Labs:  No results found for this visit on 11/29/22. Imaging: All Radiology results interpreted by Radiologist unless otherwise noted. No results found. EKG #1:  Interpreted by this provider unless otherwise noted. Time:  13:41    Rate: 71  Rhythm: Sinus. Interpretation: LBBB, paced rhythm. Comparison: 04/28/2022    Assessment / Plan   Impression(s):  Meggan Yost was seen today for dizziness.     Diagnoses and all orders for this visit:    Syncope, unspecified syncope type    Dizziness  -     EKG 12 lead; Future  -     EKG 12 lead    Pacemaker    Concentric left ventricular hypertrophy    Primary hypertension      Vitals reviewed which are stable. EKG revealed a paced rhythm, without ST elevation, T inversion, or the presence of Q waves. Pt advised close f/u with electrophysiologist and PCP for recheck and further workup if needed. ED sooner if symptoms worsen or change. ED immediately with any CP, dyspnea, palpitations, edema, diaphoresis, fever, calf pain/swelling, dizziness, further syncopal episodes, etc. Pt is in agreement with this care plan. All questions answered. Return if symptoms worsen or fail to improve.     ATUL Mooney - NP

## 2022-11-29 NOTE — TELEPHONE ENCOUNTER
Patients last appointment 11/17/2022.   Patients next scheduled appointment   Future Appointments   Date Time Provider Luis F Whalen   2/23/2023  3:30 PM Pa Webb MD [facility]

## 2022-11-30 ENCOUNTER — TELEPHONE (OUTPATIENT)
Dept: PRIMARY CARE CLINIC | Age: 62
End: 2022-11-30

## 2022-11-30 NOTE — TELEPHONE ENCOUNTER
Pt states that the letter you had given her it has to say same restrictions instead of no restrictions because doctor chrissy had put her on restrictions and your letter stating no restrictions will cancel that out.

## 2022-12-06 NOTE — TELEPHONE ENCOUNTER
Called the pharmacy without response. Can you help me with this please? I just noticed yesterday she answered me, I did not check on Friday.

## 2022-12-08 NOTE — TELEPHONE ENCOUNTER
Dr Jose Cunningham I have tried calling [facility] but phone will not connect. So I am  unable to see if this requires a PA or not and we still havent received a fax about it either. I will try to keep calling throughout the day and see if it connects.

## 2022-12-12 NOTE — TELEPHONE ENCOUNTER
Pharmacy wants to know if she is supposed to do both Advair and Symbicort. Pharmacy states doesn't look like there was an issue or needed a PA so she will get it ordered in and have it for her tomorrow.

## 2023-01-06 ENCOUNTER — COMMUNITY OUTREACH (OUTPATIENT)
Dept: PRIMARY CARE CLINIC | Age: 63
End: 2023-01-06

## 2023-03-20 ENCOUNTER — HOSPITAL ENCOUNTER (OUTPATIENT)
Dept: MAMMOGRAPHY | Age: 63
Discharge: HOME | End: 2023-03-22

## 2023-03-20 DIAGNOSIS — Z12.31 SCREENING MAMMOGRAM FOR BREAST CANCER: ICD-10-CM

## 2023-03-20 DIAGNOSIS — Z01.419 WELL WOMAN EXAM WITH ROUTINE GYNECOLOGICAL EXAM: ICD-10-CM

## 2023-03-28 NOTE — TELEPHONE ENCOUNTER
Please call pharmacy to see if there is an issue with this medication. Patient requesting refill however I already sent this in last week. Medication previously changed to Advair but reason remains unclear. I wonder if this is an insurance formulary issue, although have not received any notices from the pharmacy. Bexarotene Pregnancy And Lactation Text: This medication is Pregnancy Category X and should not be given to women who are pregnant or may become pregnant. This medication should not be used if you are breast feeding.

## 2023-04-22 DIAGNOSIS — I10 PRIMARY HYPERTENSION: ICD-10-CM

## 2023-04-22 DIAGNOSIS — J45.40 MODERATE PERSISTENT ASTHMA WITHOUT COMPLICATION: ICD-10-CM

## 2023-04-25 RX ORDER — AMLODIPINE BESYLATE 10 MG/1
10 TABLET ORAL DAILY
Qty: 30 TABLET | Refills: 5 | Status: SHIPPED | OUTPATIENT
Start: 2023-04-25

## 2023-04-25 RX ORDER — DILTIAZEM HYDROCHLORIDE 60 MG/1
2 TABLET, FILM COATED ORAL 2 TIMES DAILY
Qty: 10.2 G | Refills: 5 | Status: SHIPPED | OUTPATIENT
Start: 2023-04-25

## 2023-04-25 RX ORDER — LOSARTAN POTASSIUM 100 MG/1
100 TABLET ORAL DAILY
Qty: 30 TABLET | Refills: 5 | Status: SHIPPED | OUTPATIENT
Start: 2023-04-25

## 2023-08-24 ENCOUNTER — OFFICE VISIT (OUTPATIENT)
Dept: PRIMARY CARE CLINIC | Age: 63
End: 2023-08-24
Payer: COMMERCIAL

## 2023-08-24 VITALS
WEIGHT: 260 LBS | DIASTOLIC BLOOD PRESSURE: 74 MMHG | HEART RATE: 88 BPM | OXYGEN SATURATION: 97 % | SYSTOLIC BLOOD PRESSURE: 120 MMHG | BODY MASS INDEX: 38.51 KG/M2 | TEMPERATURE: 97.3 F | HEIGHT: 69 IN

## 2023-08-24 DIAGNOSIS — J45.40 MODERATE PERSISTENT ASTHMA WITHOUT COMPLICATION: ICD-10-CM

## 2023-08-24 DIAGNOSIS — Z12.11 SCREENING FOR COLORECTAL CANCER: ICD-10-CM

## 2023-08-24 DIAGNOSIS — M79.605 PAIN IN BOTH LOWER EXTREMITIES: ICD-10-CM

## 2023-08-24 DIAGNOSIS — G47.33 OBSTRUCTIVE SLEEP APNEA: ICD-10-CM

## 2023-08-24 DIAGNOSIS — E55.9 VITAMIN D DEFICIENCY: ICD-10-CM

## 2023-08-24 DIAGNOSIS — I10 PRIMARY HYPERTENSION: Primary | ICD-10-CM

## 2023-08-24 DIAGNOSIS — I51.7 CONCENTRIC LEFT VENTRICULAR HYPERTROPHY: ICD-10-CM

## 2023-08-24 DIAGNOSIS — N18.2 CKD (CHRONIC KIDNEY DISEASE) STAGE 2, GFR 60-89 ML/MIN: ICD-10-CM

## 2023-08-24 DIAGNOSIS — M79.604 PAIN IN BOTH LOWER EXTREMITIES: ICD-10-CM

## 2023-08-24 DIAGNOSIS — Z12.31 SCREENING MAMMOGRAM FOR BREAST CANCER: ICD-10-CM

## 2023-08-24 DIAGNOSIS — Z12.12 SCREENING FOR COLORECTAL CANCER: ICD-10-CM

## 2023-08-24 DIAGNOSIS — I87.2 CHRONIC VENOUS INSUFFICIENCY: ICD-10-CM

## 2023-08-24 DIAGNOSIS — R73.03 PREDIABETES: ICD-10-CM

## 2023-08-24 DIAGNOSIS — I44.2 THIRD DEGREE AV BLOCK (HCC): ICD-10-CM

## 2023-08-24 PROCEDURE — 1036F TOBACCO NON-USER: CPT | Performed by: STUDENT IN AN ORGANIZED HEALTH CARE EDUCATION/TRAINING PROGRAM

## 2023-08-24 PROCEDURE — 3017F COLORECTAL CA SCREEN DOC REV: CPT | Performed by: STUDENT IN AN ORGANIZED HEALTH CARE EDUCATION/TRAINING PROGRAM

## 2023-08-24 PROCEDURE — 99214 OFFICE O/P EST MOD 30 MIN: CPT | Performed by: STUDENT IN AN ORGANIZED HEALTH CARE EDUCATION/TRAINING PROGRAM

## 2023-08-24 PROCEDURE — G8417 CALC BMI ABV UP PARAM F/U: HCPCS | Performed by: STUDENT IN AN ORGANIZED HEALTH CARE EDUCATION/TRAINING PROGRAM

## 2023-08-24 PROCEDURE — G8427 DOCREV CUR MEDS BY ELIG CLIN: HCPCS | Performed by: STUDENT IN AN ORGANIZED HEALTH CARE EDUCATION/TRAINING PROGRAM

## 2023-08-24 PROCEDURE — 95810 POLYSOM 6/> YRS 4/> PARAM: CPT | Performed by: STUDENT IN AN ORGANIZED HEALTH CARE EDUCATION/TRAINING PROGRAM

## 2023-08-24 PROCEDURE — 3074F SYST BP LT 130 MM HG: CPT | Performed by: STUDENT IN AN ORGANIZED HEALTH CARE EDUCATION/TRAINING PROGRAM

## 2023-08-24 PROCEDURE — 3078F DIAST BP <80 MM HG: CPT | Performed by: STUDENT IN AN ORGANIZED HEALTH CARE EDUCATION/TRAINING PROGRAM

## 2023-08-24 RX ORDER — LOSARTAN POTASSIUM 100 MG/1
100 TABLET ORAL DAILY
Qty: 30 TABLET | Refills: 5 | Status: SHIPPED | OUTPATIENT
Start: 2023-08-24

## 2023-08-24 RX ORDER — ERGOCALCIFEROL 1.25 MG/1
50000 CAPSULE ORAL WEEKLY
Qty: 4 CAPSULE | Refills: 5 | Status: SHIPPED | OUTPATIENT
Start: 2023-08-24

## 2023-08-24 RX ORDER — ALBUTEROL SULFATE 90 UG/1
2 AEROSOL, METERED RESPIRATORY (INHALATION) 4 TIMES DAILY PRN
Qty: 18 G | Refills: 5 | Status: SHIPPED | OUTPATIENT
Start: 2023-08-24

## 2023-08-24 RX ORDER — DILTIAZEM HYDROCHLORIDE 60 MG/1
2 TABLET, FILM COATED ORAL 2 TIMES DAILY
Qty: 10.2 G | Refills: 5 | Status: SHIPPED | OUTPATIENT
Start: 2023-08-24

## 2023-08-24 RX ORDER — AMLODIPINE BESYLATE 10 MG/1
10 TABLET ORAL DAILY
Qty: 30 TABLET | Refills: 5 | Status: SHIPPED | OUTPATIENT
Start: 2023-08-24

## 2023-08-24 ASSESSMENT — ENCOUNTER SYMPTOMS
DIARRHEA: 0
CONSTIPATION: 0
BLOOD IN STOOL: 0
SHORTNESS OF BREATH: 0
CHEST TIGHTNESS: 0
COUGH: 0
BACK PAIN: 0

## 2023-08-24 NOTE — PATIENT INSTRUCTIONS
Call [facility] to schedule mammogram and sleep study. Surgeon should call you for colonoscopy.

## 2023-08-24 NOTE — PROGRESS NOTES
Expiration Date:   8/24/2024    External Referral To General Surgery     Referral Priority:   Routine     Referral Type:   Eval and Treat     Referral Reason:   Specialty Services Required     Requested Specialty:   General Surgery     Number of Visits Requested:   1    MT POLYSOM 6/>YRS SLEEP 4/> ADDL CAMILO ATTND     [facility]      No results found for this or any previous visit (from the past 24 hour(s)).

## 2023-08-25 LAB
ALBUMIN SERPL-MCNC: 4.4 G/DL (ref 3.5–5.2)
ALP BLD-CCNC: 88 U/L (ref 35–104)
ALT SERPL-CCNC: 17 U/L (ref 0–32)
ANION GAP SERPL CALCULATED.3IONS-SCNC: 12 MMOL/L (ref 7–16)
AST SERPL-CCNC: 21 U/L (ref 0–31)
BILIRUB SERPL-MCNC: 0.2 MG/DL (ref 0–1.2)
BUN BLDV-MCNC: 14 MG/DL (ref 6–23)
CALCIUM SERPL-MCNC: 9.8 MG/DL (ref 8.6–10.2)
CHLORIDE BLD-SCNC: 105 MMOL/L (ref 98–107)
CO2: 22 MMOL/L (ref 22–29)
CREAT SERPL-MCNC: 1.3 MG/DL (ref 0.5–1)
FERRITIN: 110 NG/ML
FOLATE: >20 NG/ML (ref 4.8–24.2)
GFR SERPL CREATININE-BSD FRML MDRD: 47 ML/MIN/1.73M2
GLUCOSE BLD-MCNC: 98 MG/DL (ref 74–99)
HBA1C MFR BLD: 6 % (ref 4–5.6)
HCT VFR BLD CALC: 36.3 % (ref 34–48)
HEMOGLOBIN: 11 G/DL (ref 11.5–15.5)
IRON SATURATION: 15 % (ref 15–50)
IRON: 47 UG/DL (ref 37–145)
MAGNESIUM: 1.9 MG/DL (ref 1.6–2.6)
MCH RBC QN AUTO: 26.4 PG (ref 26–35)
MCHC RBC AUTO-ENTMCNC: 30.3 G/DL (ref 32–34.5)
MCV RBC AUTO: 87.1 FL (ref 80–99.9)
PDW BLD-RTO: 14.6 % (ref 11.5–15)
PLATELET # BLD: 242 K/UL (ref 130–450)
PMV BLD AUTO: 9.9 FL (ref 7–12)
POTASSIUM SERPL-SCNC: 5 MMOL/L (ref 3.5–5)
RBC # BLD: 4.17 M/UL (ref 3.5–5.5)
SODIUM BLD-SCNC: 139 MMOL/L (ref 132–146)
TOTAL IRON BINDING CAPACITY: 304 UG/DL (ref 250–450)
TOTAL PROTEIN: 8.1 G/DL (ref 6.4–8.3)
TSH SERPL DL<=0.05 MIU/L-ACNC: 0.81 UIU/ML (ref 0.27–4.2)
VITAMIN B-12: 746 PG/ML (ref 211–946)
VITAMIN D 25-HYDROXY: 50.7 NG/ML (ref 30–100)
WBC # BLD: 8.3 K/UL (ref 4.5–11.5)

## 2023-09-05 PROBLEM — I87.2 CHRONIC VENOUS INSUFFICIENCY: Status: ACTIVE | Noted: 2023-09-05

## 2023-09-06 NOTE — ASSESSMENT & PLAN NOTE
S/p emergency pacemaker 3/24/2022 at [address] [place] [address] with electrophysiology  Regular rate and rhythm today

## 2023-09-07 PROBLEM — D64.9 NORMOCYTIC ANEMIA: Status: ACTIVE | Noted: 2023-09-07

## 2023-09-15 DIAGNOSIS — I87.2 CHRONIC VENOUS INSUFFICIENCY: ICD-10-CM

## 2023-09-15 DIAGNOSIS — G25.81 RESTLESS LEG SYNDROME: ICD-10-CM

## 2023-09-15 RX ORDER — GABAPENTIN 100 MG/1
100 CAPSULE ORAL NIGHTLY
Qty: 30 CAPSULE | Refills: 5 | Status: SHIPPED | OUTPATIENT
Start: 2023-09-15 | End: 2024-03-13

## 2024-01-15 ENCOUNTER — TELEPHONE (OUTPATIENT)
Dept: PRIMARY CARE CLINIC | Age: 64
End: 2024-01-15

## 2024-01-15 ENCOUNTER — HOSPITAL ENCOUNTER (OUTPATIENT)
Dept: MAMMOGRAPHY | Age: 64
Discharge: HOME | End: 2024-01-17
Payer: COMMERCIAL

## 2024-01-15 VITALS — HEIGHT: 70 IN | BODY MASS INDEX: 35.07 KG/M2 | WEIGHT: 245 LBS

## 2024-01-15 DIAGNOSIS — Z12.31 SCREENING MAMMOGRAM FOR BREAST CANCER: ICD-10-CM

## 2024-01-15 PROCEDURE — 77063 BREAST TOMOSYNTHESIS BI: CPT

## 2024-01-15 NOTE — TELEPHONE ENCOUNTER
Patient stopped in, in between getting Mammogram done. She is having a hard time with [employer], and they are telling her she needs modification/ added to her permanent restrictions. Patient will not be able to go up and down on the OP (Its a lift that goes up 50 feet, and down). Patient said HR is aware of weight limit, but needs for this. If we can please send new letter to fax# [phone] and email to: [email or web address]. Patient asked if we could call her once we send it, and if there is any questions.

## 2024-01-16 DIAGNOSIS — R92.8 ABNORMALITY OF RIGHT BREAST ON SCREENING MAMMOGRAM: Primary | ICD-10-CM

## 2024-01-19 ENCOUNTER — TELEPHONE (OUTPATIENT)
Dept: GENERAL RADIOLOGY | Age: 64
End: 2024-01-19

## 2024-01-19 NOTE — TELEPHONE ENCOUNTER
Pcp signed letter e-mail to patient, confirmed e-mail.   E-mailed to: [email or web address]  -advised patient to call back if doesn't receive letter via e-mail as sent, patient advised letter would also be viewable in My Chart. Patient currently has old/wrong [employer] kelsey downloaded, will switch over to correct one.

## 2024-01-19 NOTE — TELEPHONE ENCOUNTER
Called patient, requested to have US faxed to [facility]. Notified patient I would fax order today and gave her scheduling number to call. Patient relayed she didn't receive copy of completed forms from us via e-mail, requested clinical to send again if possible.     SOLEDAD Hampton.

## 2024-01-19 NOTE — TELEPHONE ENCOUNTER
Call to patient in reference to her mammogram performed on the [facility] on January 15, 2024.  Instructed patient that the radiologist has recommended some additional breast imaging, in order to make a final determination/result. Patient stated that she would like to go to [facility] for the imaging.  She states she will call her doctor to have the order sent there.  Notified Dr Swift.

## 2024-02-29 ENCOUNTER — OFFICE VISIT (OUTPATIENT)
Dept: PRIMARY CARE CLINIC | Age: 64
End: 2024-02-29
Payer: COMMERCIAL

## 2024-02-29 VITALS
HEIGHT: 70 IN | TEMPERATURE: 98.1 F | SYSTOLIC BLOOD PRESSURE: 136 MMHG | WEIGHT: 246.6 LBS | HEART RATE: 87 BPM | OXYGEN SATURATION: 98 % | BODY MASS INDEX: 35.3 KG/M2 | DIASTOLIC BLOOD PRESSURE: 80 MMHG

## 2024-02-29 DIAGNOSIS — J45.40 MODERATE PERSISTENT ASTHMA WITHOUT COMPLICATION: ICD-10-CM

## 2024-02-29 DIAGNOSIS — N18.31 CHRONIC KIDNEY DISEASE, STAGE 3A (HCC): ICD-10-CM

## 2024-02-29 DIAGNOSIS — D64.9 NORMOCYTIC ANEMIA: ICD-10-CM

## 2024-02-29 DIAGNOSIS — N63.10 MASS OF RIGHT BREAST, UNSPECIFIED QUADRANT: ICD-10-CM

## 2024-02-29 DIAGNOSIS — I44.2 THIRD DEGREE AV BLOCK (HCC): Primary | ICD-10-CM

## 2024-02-29 DIAGNOSIS — E55.9 VITAMIN D DEFICIENCY: ICD-10-CM

## 2024-02-29 DIAGNOSIS — E66.01 CLASS 2 SEVERE OBESITY WITH SERIOUS COMORBIDITY AND BODY MASS INDEX (BMI) OF 35.0 TO 35.9 IN ADULT, UNSPECIFIED OBESITY TYPE (HCC): ICD-10-CM

## 2024-02-29 DIAGNOSIS — R73.03 PREDIABETES: ICD-10-CM

## 2024-02-29 DIAGNOSIS — I10 PRIMARY HYPERTENSION: ICD-10-CM

## 2024-02-29 DIAGNOSIS — G47.33 OBSTRUCTIVE SLEEP APNEA: ICD-10-CM

## 2024-02-29 DIAGNOSIS — Z87.892 HISTORY OF ANAPHYLAXIS: ICD-10-CM

## 2024-02-29 DIAGNOSIS — Z95.0 S/P PLACEMENT OF CARDIAC PACEMAKER: ICD-10-CM

## 2024-02-29 DIAGNOSIS — G25.81 RESTLESS LEG SYNDROME: ICD-10-CM

## 2024-02-29 DIAGNOSIS — Z91.030 ALLERGY TO HONEY BEE VENOM: ICD-10-CM

## 2024-02-29 PROBLEM — E66.812 CLASS 2 SEVERE OBESITY WITH SERIOUS COMORBIDITY AND BODY MASS INDEX (BMI) OF 35.0 TO 35.9 IN ADULT: Status: ACTIVE | Noted: 2024-02-29

## 2024-02-29 LAB
ANION GAP SERPL CALCULATED.3IONS-SCNC: 11 MMOL/L (ref 7–16)
BUN BLDV-MCNC: 19 MG/DL (ref 6–23)
CALCIUM SERPL-MCNC: 9.8 MG/DL (ref 8.6–10.2)
CHLORIDE BLD-SCNC: 107 MMOL/L (ref 98–107)
CO2: 25 MMOL/L (ref 22–29)
CREAT SERPL-MCNC: 1.1 MG/DL (ref 0.5–1)
GFR SERPL CREATININE-BSD FRML MDRD: 56 ML/MIN/1.73M2
GLUCOSE BLD-MCNC: 89 MG/DL (ref 74–99)
HBA1C MFR BLD: 6 %
HCT VFR BLD CALC: 35.1 % (ref 34–48)
HEMOGLOBIN: 10.9 G/DL (ref 11.5–15.5)
MCH RBC QN AUTO: 26.5 PG (ref 26–35)
MCHC RBC AUTO-ENTMCNC: 31.1 G/DL (ref 32–34.5)
MCV RBC AUTO: 85.4 FL (ref 80–99.9)
PDW BLD-RTO: 14.8 % (ref 11.5–15)
PLATELET # BLD: 223 K/UL (ref 130–450)
PMV BLD AUTO: 9.6 FL (ref 7–12)
POTASSIUM SERPL-SCNC: 4.4 MMOL/L (ref 3.5–5)
RBC # BLD: 4.11 M/UL (ref 3.5–5.5)
SODIUM BLD-SCNC: 143 MMOL/L (ref 132–146)
WBC # BLD: 6.8 K/UL (ref 4.5–11.5)

## 2024-02-29 PROCEDURE — 3017F COLORECTAL CA SCREEN DOC REV: CPT | Performed by: STUDENT IN AN ORGANIZED HEALTH CARE EDUCATION/TRAINING PROGRAM

## 2024-02-29 PROCEDURE — 83036 HEMOGLOBIN GLYCOSYLATED A1C: CPT | Performed by: STUDENT IN AN ORGANIZED HEALTH CARE EDUCATION/TRAINING PROGRAM

## 2024-02-29 PROCEDURE — 3075F SYST BP GE 130 - 139MM HG: CPT | Performed by: STUDENT IN AN ORGANIZED HEALTH CARE EDUCATION/TRAINING PROGRAM

## 2024-02-29 PROCEDURE — 99214 OFFICE O/P EST MOD 30 MIN: CPT | Performed by: STUDENT IN AN ORGANIZED HEALTH CARE EDUCATION/TRAINING PROGRAM

## 2024-02-29 PROCEDURE — 3079F DIAST BP 80-89 MM HG: CPT | Performed by: STUDENT IN AN ORGANIZED HEALTH CARE EDUCATION/TRAINING PROGRAM

## 2024-02-29 PROCEDURE — G8484 FLU IMMUNIZE NO ADMIN: HCPCS | Performed by: STUDENT IN AN ORGANIZED HEALTH CARE EDUCATION/TRAINING PROGRAM

## 2024-02-29 PROCEDURE — G8417 CALC BMI ABV UP PARAM F/U: HCPCS | Performed by: STUDENT IN AN ORGANIZED HEALTH CARE EDUCATION/TRAINING PROGRAM

## 2024-02-29 PROCEDURE — G8427 DOCREV CUR MEDS BY ELIG CLIN: HCPCS | Performed by: STUDENT IN AN ORGANIZED HEALTH CARE EDUCATION/TRAINING PROGRAM

## 2024-02-29 PROCEDURE — 1036F TOBACCO NON-USER: CPT | Performed by: STUDENT IN AN ORGANIZED HEALTH CARE EDUCATION/TRAINING PROGRAM

## 2024-02-29 RX ORDER — EPINEPHRINE 0.3 MG/.3ML
0.3 INJECTION SUBCUTANEOUS PRN
Qty: 2 EACH | Refills: 1 | Status: SHIPPED | OUTPATIENT
Start: 2024-02-29

## 2024-02-29 RX ORDER — DILTIAZEM HYDROCHLORIDE 60 MG/1
2 TABLET, FILM COATED ORAL 2 TIMES DAILY
Qty: 10.2 G | Refills: 5 | Status: SHIPPED | OUTPATIENT
Start: 2024-02-29

## 2024-02-29 RX ORDER — LOSARTAN POTASSIUM 100 MG/1
100 TABLET ORAL DAILY
Qty: 30 TABLET | Refills: 5 | Status: SHIPPED | OUTPATIENT
Start: 2024-02-29

## 2024-02-29 RX ORDER — AMLODIPINE BESYLATE 10 MG/1
10 TABLET ORAL DAILY
Qty: 30 TABLET | Refills: 5 | Status: SHIPPED | OUTPATIENT
Start: 2024-02-29

## 2024-02-29 RX ORDER — ERGOCALCIFEROL 1.25 MG/1
50000 CAPSULE ORAL WEEKLY
Qty: 4 CAPSULE | Refills: 5 | Status: SHIPPED | OUTPATIENT
Start: 2024-02-29

## 2024-02-29 RX ORDER — ALBUTEROL SULFATE 90 UG/1
2 AEROSOL, METERED RESPIRATORY (INHALATION) 4 TIMES DAILY PRN
Qty: 18 G | Refills: 5 | Status: SHIPPED | OUTPATIENT
Start: 2024-02-29

## 2024-02-29 RX ORDER — EPINEPHRINE 0.3 MG/.3ML
0.3 INJECTION SUBCUTANEOUS PRN
COMMUNITY
End: 2024-02-29 | Stop reason: SDUPTHER

## 2024-02-29 RX ORDER — GABAPENTIN 100 MG/1
100 CAPSULE ORAL NIGHTLY
Qty: 30 CAPSULE | Refills: 5 | Status: SHIPPED | OUTPATIENT
Start: 2024-02-29 | End: 2024-08-27

## 2024-02-29 RX ORDER — PANTOPRAZOLE SODIUM 20 MG/1
20 TABLET, DELAYED RELEASE ORAL
COMMUNITY
Start: 2023-08-17

## 2024-02-29 SDOH — ECONOMIC STABILITY: HOUSING INSECURITY
IN THE LAST 12 MONTHS, WAS THERE A TIME WHEN YOU DID NOT HAVE A STEADY PLACE TO SLEEP OR SLEPT IN A SHELTER (INCLUDING NOW)?: NO

## 2024-02-29 SDOH — ECONOMIC STABILITY: FOOD INSECURITY: WITHIN THE PAST 12 MONTHS, YOU WORRIED THAT YOUR FOOD WOULD RUN OUT BEFORE YOU GOT MONEY TO BUY MORE.: NEVER TRUE

## 2024-02-29 SDOH — ECONOMIC STABILITY: INCOME INSECURITY: HOW HARD IS IT FOR YOU TO PAY FOR THE VERY BASICS LIKE FOOD, HOUSING, MEDICAL CARE, AND HEATING?: NOT HARD AT ALL

## 2024-02-29 SDOH — ECONOMIC STABILITY: FOOD INSECURITY: WITHIN THE PAST 12 MONTHS, THE FOOD YOU BOUGHT JUST DIDN'T LAST AND YOU DIDN'T HAVE MONEY TO GET MORE.: NEVER TRUE

## 2024-02-29 ASSESSMENT — PATIENT HEALTH QUESTIONNAIRE - PHQ9
1. LITTLE INTEREST OR PLEASURE IN DOING THINGS: 0
SUM OF ALL RESPONSES TO PHQ QUESTIONS 1-9: 0
5. POOR APPETITE OR OVEREATING: 0
7. TROUBLE CONCENTRATING ON THINGS, SUCH AS READING THE NEWSPAPER OR WATCHING TELEVISION: 0
9. THOUGHTS THAT YOU WOULD BE BETTER OFF DEAD, OR OF HURTING YOURSELF: 0
SUM OF ALL RESPONSES TO PHQ QUESTIONS 1-9: 0
8. MOVING OR SPEAKING SO SLOWLY THAT OTHER PEOPLE COULD HAVE NOTICED. OR THE OPPOSITE, BEING SO FIGETY OR RESTLESS THAT YOU HAVE BEEN MOVING AROUND A LOT MORE THAN USUAL: 0
6. FEELING BAD ABOUT YOURSELF - OR THAT YOU ARE A FAILURE OR HAVE LET YOURSELF OR YOUR FAMILY DOWN: 0
3. TROUBLE FALLING OR STAYING ASLEEP: 0
SUM OF ALL RESPONSES TO PHQ9 QUESTIONS 1 & 2: 0
10. IF YOU CHECKED OFF ANY PROBLEMS, HOW DIFFICULT HAVE THESE PROBLEMS MADE IT FOR YOU TO DO YOUR WORK, TAKE CARE OF THINGS AT HOME, OR GET ALONG WITH OTHER PEOPLE: 0
SUM OF ALL RESPONSES TO PHQ QUESTIONS 1-9: 0
4. FEELING TIRED OR HAVING LITTLE ENERGY: 0
2. FEELING DOWN, DEPRESSED OR HOPELESS: 0
SUM OF ALL RESPONSES TO PHQ QUESTIONS 1-9: 0

## 2024-02-29 ASSESSMENT — ENCOUNTER SYMPTOMS
COUGH: 0
BLOOD IN STOOL: 0
CONSTIPATION: 0
SHORTNESS OF BREATH: 0
DIARRHEA: 0
BACK PAIN: 0
CHEST TIGHTNESS: 0

## 2024-02-29 NOTE — PROGRESS NOTES
Requested.  
(!) 146/86 136/80   Site: Right Upper Arm Left Upper Arm   Position: Sitting Sitting   Cuff Size: Large Adult Large Adult   Pulse: 87    Temp: 98.1 °F (36.7 °C)    SpO2: 98%    Weight: 111.9 kg (246 lb 9.6 oz)    Height: 1.778 m (5' 10\")      BP Readings from Last 3 Encounters:   02/29/24 136/80   08/24/23 120/74   02/23/23 138/82     Wt Readings from Last 3 Encounters:   02/29/24 111.9 kg (246 lb 9.6 oz)   01/15/24 111.1 kg (245 lb)   08/24/23 117.9 kg (260 lb)     Physical Exam  Vitals and nursing note reviewed.   Constitutional:       General: She is not in acute distress.     Appearance: Normal appearance. She is obese. She is not ill-appearing or diaphoretic.   Cardiovascular:      Rate and Rhythm: Normal rate and regular rhythm.      Heart sounds: Normal heart sounds.   Pulmonary:      Effort: Pulmonary effort is normal. No respiratory distress.      Breath sounds: Normal breath sounds.   Musculoskeletal:      Right lower leg: No edema.      Left lower leg: No edema.   Skin:     General: Skin is warm and dry.   Neurological:      Mental Status: She is alert and oriented to person, place, and time.   Psychiatric:         Mood and Affect: Mood normal.         Behavior: Behavior normal.       Testing:     Orders Placed This Encounter   Procedures    Basic Metabolic Panel     Standing Status:   Future     Standing Expiration Date:   2/28/2025    CBC     Standing Status:   Future     Standing Expiration Date:   2/28/2025    Microalbumin, Ur     Standing Status:   Future     Standing Expiration Date:   2/28/2025    POCT glycosylated hemoglobin (Hb A1C)     Recent Results (from the past 24 hour(s))   POCT glycosylated hemoglobin (Hb A1C)    Collection Time: 02/29/24  9:01 AM   Result Value Ref Range    Hemoglobin A1C 6.0 %

## 2024-02-29 NOTE — ASSESSMENT & PLAN NOTE
Chronic, stable  S/p emergency pacemaker 3/24/2022 at [facility]  Follows with cardiology/electrophysiology  Regular rate and rhythm today

## 2024-02-29 NOTE — ASSESSMENT & PLAN NOTE
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

## 2024-02-29 NOTE — ASSESSMENT & PLAN NOTE
Chronic, controlled  Some symptoms recently due to weather changes  Continue Symbicort, albuterol as needed

## 2024-03-01 RX ORDER — DILTIAZEM HYDROCHLORIDE 60 MG/1
2 TABLET, FILM COATED ORAL 2 TIMES DAILY
Qty: 10.2 G | Refills: 5 | OUTPATIENT
Start: 2024-03-01

## 2024-03-01 RX ORDER — ALBUTEROL SULFATE 90 UG/1
2 AEROSOL, METERED RESPIRATORY (INHALATION) 4 TIMES DAILY PRN
Qty: 18 G | Refills: 5 | OUTPATIENT
Start: 2024-03-01

## 2024-03-15 ENCOUNTER — TELEPHONE (OUTPATIENT)
Dept: PRIMARY CARE CLINIC | Age: 64
End: 2024-03-15

## 2024-03-15 NOTE — TELEPHONE ENCOUNTER
----- Message from Spenecr Swift MD sent at 2/29/2024  8:47 AM EST -----  [facility] cardio/EP last notes

## 2024-03-15 NOTE — TELEPHONE ENCOUNTER
Porsha confirmed they had never seen patient before, she had an appointment in Oct 2023 and she cancelled.    SOLEDAD Cruz.

## 2024-03-23 DIAGNOSIS — J45.40 MODERATE PERSISTENT ASTHMA WITHOUT COMPLICATION: ICD-10-CM

## 2024-03-23 DIAGNOSIS — E55.9 VITAMIN D DEFICIENCY: ICD-10-CM

## 2024-03-23 DIAGNOSIS — I10 PRIMARY HYPERTENSION: ICD-10-CM

## 2024-03-23 DIAGNOSIS — G25.81 RESTLESS LEG SYNDROME: ICD-10-CM

## 2024-03-25 NOTE — TELEPHONE ENCOUNTER
Patients last appointment 2/29/2024.  Patients next scheduled appointment   Future Appointments   Date Time Provider Department Center   8/29/2024  8:00 AM Spencer Swift MD [facility]

## 2024-03-26 RX ORDER — GABAPENTIN 100 MG/1
100 CAPSULE ORAL NIGHTLY
Qty: 30 CAPSULE | Refills: 5 | OUTPATIENT
Start: 2024-03-26 | End: 2024-09-22

## 2024-03-26 RX ORDER — DILTIAZEM HYDROCHLORIDE 60 MG/1
2 TABLET, FILM COATED ORAL 2 TIMES DAILY
Qty: 10.2 G | Refills: 5 | OUTPATIENT
Start: 2024-03-26

## 2024-03-26 RX ORDER — ERGOCALCIFEROL 1.25 MG/1
50000 CAPSULE ORAL WEEKLY
Qty: 4 CAPSULE | Refills: 5 | OUTPATIENT
Start: 2024-03-26

## 2024-03-26 RX ORDER — LOSARTAN POTASSIUM 100 MG/1
100 TABLET ORAL DAILY
Qty: 30 TABLET | Refills: 5 | OUTPATIENT
Start: 2024-03-26

## 2024-03-26 RX ORDER — ALBUTEROL SULFATE 90 UG/1
2 AEROSOL, METERED RESPIRATORY (INHALATION) 4 TIMES DAILY PRN
Qty: 18 G | Refills: 5 | OUTPATIENT
Start: 2024-03-26

## 2024-03-26 RX ORDER — AMLODIPINE BESYLATE 10 MG/1
10 TABLET ORAL DAILY
Qty: 30 TABLET | Refills: 5 | OUTPATIENT
Start: 2024-03-26

## 2024-04-24 ENCOUNTER — TELEPHONE (OUTPATIENT)
Dept: PRIMARY CARE CLINIC | Age: 64
End: 2024-04-24

## 2024-04-24 NOTE — TELEPHONE ENCOUNTER
Patient informed but she will be bringing the forms in tomorrow that she got from [employer] anyway so we can compare and make sure they are the same forms. She states she will need it to also say 45lb instead of 50 and she can't use the lift.

## 2024-04-24 NOTE — TELEPHONE ENCOUNTER
Patient called in panicking because [employer] sent her home. They told her that they dont have permanent accommodations for [employer] so she technically will need to renew her forms every year. So they are requiring you to fill out that paperwork all over again. Patient would like to see you for an appointment tomorrow to discuss everything with you.

## 2024-04-24 NOTE — TELEPHONE ENCOUNTER
I believe my only opening was used for another patient. Last request I sent stated \"permanently\". Can resend request with updated dates. Will fax today from [place] office.

## 2024-04-25 ENCOUNTER — OFFICE VISIT (OUTPATIENT)
Dept: PRIMARY CARE CLINIC | Age: 64
End: 2024-04-25
Payer: COMMERCIAL

## 2024-04-25 VITALS
WEIGHT: 245.4 LBS | OXYGEN SATURATION: 97 % | HEIGHT: 70 IN | SYSTOLIC BLOOD PRESSURE: 134 MMHG | HEART RATE: 90 BPM | DIASTOLIC BLOOD PRESSURE: 82 MMHG | TEMPERATURE: 98.6 F | BODY MASS INDEX: 35.13 KG/M2

## 2024-04-25 DIAGNOSIS — I44.2 THIRD DEGREE AV BLOCK (HCC): Primary | ICD-10-CM

## 2024-04-25 DIAGNOSIS — Z95.0 S/P PLACEMENT OF CARDIAC PACEMAKER: ICD-10-CM

## 2024-04-25 PROCEDURE — 3075F SYST BP GE 130 - 139MM HG: CPT | Performed by: STUDENT IN AN ORGANIZED HEALTH CARE EDUCATION/TRAINING PROGRAM

## 2024-04-25 PROCEDURE — G8417 CALC BMI ABV UP PARAM F/U: HCPCS | Performed by: STUDENT IN AN ORGANIZED HEALTH CARE EDUCATION/TRAINING PROGRAM

## 2024-04-25 PROCEDURE — 99213 OFFICE O/P EST LOW 20 MIN: CPT | Performed by: STUDENT IN AN ORGANIZED HEALTH CARE EDUCATION/TRAINING PROGRAM

## 2024-04-25 PROCEDURE — 3017F COLORECTAL CA SCREEN DOC REV: CPT | Performed by: STUDENT IN AN ORGANIZED HEALTH CARE EDUCATION/TRAINING PROGRAM

## 2024-04-25 PROCEDURE — G8427 DOCREV CUR MEDS BY ELIG CLIN: HCPCS | Performed by: STUDENT IN AN ORGANIZED HEALTH CARE EDUCATION/TRAINING PROGRAM

## 2024-04-25 PROCEDURE — 3079F DIAST BP 80-89 MM HG: CPT | Performed by: STUDENT IN AN ORGANIZED HEALTH CARE EDUCATION/TRAINING PROGRAM

## 2024-04-25 PROCEDURE — G2211 COMPLEX E/M VISIT ADD ON: HCPCS | Performed by: STUDENT IN AN ORGANIZED HEALTH CARE EDUCATION/TRAINING PROGRAM

## 2024-04-25 PROCEDURE — 1036F TOBACCO NON-USER: CPT | Performed by: STUDENT IN AN ORGANIZED HEALTH CARE EDUCATION/TRAINING PROGRAM

## 2024-04-25 ASSESSMENT — ENCOUNTER SYMPTOMS
CHEST TIGHTNESS: 0
COUGH: 0
SHORTNESS OF BREATH: 0
CONSTIPATION: 0
DIARRHEA: 0
BACK PAIN: 0
BLOOD IN STOOL: 0

## 2024-04-25 NOTE — PATIENT INSTRUCTIONS
[facility]  Electrophysiology  Address: [address] [place] [address] [address], [place] [address]  Phone: [phone]

## 2024-04-25 NOTE — PROGRESS NOTES
ESTABLISHED PRIMARY CARE VISIT    24  Name: Sully Shrestha   : 1960   Age: 63 y.o.  Sex: female        Assessment & Plan:      Diagnosis Orders   1. Third degree AV block (HCC)        2. S/P placement of cardiac pacemaker          Completed forms for work accommodations.    Counseled patient regarding above diagnosis, including possible risks and complications, especially if left uncontrolled.  Counseled patient as appropriate and relevant regarding any possible side effects, risks, and alternatives to treatment; the patient verbalizes understanding, and is in agreement with the plan as detailed above.   All educational materials and instructions were discussed and included on the After Visit Summary.  All questions answered to the patient's satisfaction.  The patient was advised to call or send Digitel message for any concerns prior to next appointment.    Return in about 4 months (around 2024).    24 minutes was spent precharting, face-to-face with patient, and documenting on day of encounter.    This visit is inherently complex due to evaluation and management associated with medical care services that serve as the continuing focal point for health/medical care services that are part of ongoing care related to a patient's single, serious condition or a complex condition.     Subjective:     Chief Complaint   Patient presents with    Forms     Discuss forms for work      Patient returns for follow-up  Needs forms redone for work accommodations    Review of Systems   Constitutional:  Negative for diaphoresis and fatigue.   Eyes:  Negative for visual disturbance.   Respiratory:  Negative for cough, chest tightness and shortness of breath.    Cardiovascular:  Negative for chest pain, palpitations and leg swelling.   Gastrointestinal:  Negative for blood in stool, constipation and diarrhea.   Genitourinary:  Negative for difficulty urinating and dysuria.   Musculoskeletal:  Positive for

## 2024-05-15 DIAGNOSIS — R92.8 ABNORMALITY OF RIGHT BREAST ON SCREENING MAMMOGRAM: ICD-10-CM

## 2024-07-27 DIAGNOSIS — J45.40 MODERATE PERSISTENT ASTHMA WITHOUT COMPLICATION: ICD-10-CM

## 2024-07-27 DIAGNOSIS — G25.81 RESTLESS LEG SYNDROME: ICD-10-CM

## 2024-07-29 RX ORDER — DILTIAZEM HYDROCHLORIDE 60 MG/1
2 TABLET, FILM COATED ORAL 2 TIMES DAILY
Qty: 10.2 G | Refills: 5 | Status: SHIPPED | OUTPATIENT
Start: 2024-07-29

## 2024-07-29 RX ORDER — GABAPENTIN 100 MG/1
100 CAPSULE ORAL NIGHTLY
Qty: 30 CAPSULE | Refills: 0 | Status: SHIPPED | OUTPATIENT
Start: 2024-07-29 | End: 2024-08-28

## 2024-07-29 NOTE — TELEPHONE ENCOUNTER
Patients last appointment 4/25/2024.  Patients next scheduled appointment   Future Appointments   Date Time Provider Department Center   8/29/2024  8:00 AM Spencer Swift MD [facility]

## 2024-07-29 NOTE — TELEPHONE ENCOUNTER
Patients last appointment 4/25/2024.  Patients next scheduled appointment   Future Appointments   Date Time Provider Department Center   8/29/2024  8:00 AM Spencer Swift MD [facility]

## 2024-08-29 ENCOUNTER — OFFICE VISIT (OUTPATIENT)
Dept: PRIMARY CARE CLINIC | Age: 64
End: 2024-08-29

## 2024-08-29 VITALS
WEIGHT: 237.4 LBS | BODY MASS INDEX: 33.99 KG/M2 | TEMPERATURE: 97.5 F | HEART RATE: 91 BPM | HEIGHT: 70 IN | OXYGEN SATURATION: 97 % | SYSTOLIC BLOOD PRESSURE: 110 MMHG | DIASTOLIC BLOOD PRESSURE: 72 MMHG

## 2024-08-29 DIAGNOSIS — I87.2 CHRONIC VENOUS INSUFFICIENCY: ICD-10-CM

## 2024-08-29 DIAGNOSIS — I10 PRIMARY HYPERTENSION: ICD-10-CM

## 2024-08-29 DIAGNOSIS — T82.9XXD COMPLICATION ASSOCIATED WITH CARDIAC PACEMAKER LEAD, SUBSEQUENT ENCOUNTER: ICD-10-CM

## 2024-08-29 DIAGNOSIS — R73.03 PREDIABETES: ICD-10-CM

## 2024-08-29 DIAGNOSIS — N63.10 MASS OF RIGHT BREAST, UNSPECIFIED QUADRANT: ICD-10-CM

## 2024-08-29 DIAGNOSIS — I44.2 THIRD DEGREE AV BLOCK (HCC): Primary | ICD-10-CM

## 2024-08-29 DIAGNOSIS — J45.40 MODERATE PERSISTENT ASTHMA WITHOUT COMPLICATION: ICD-10-CM

## 2024-08-29 DIAGNOSIS — Z23 NEED FOR INFLUENZA VACCINATION: ICD-10-CM

## 2024-08-29 DIAGNOSIS — Z12.12 SCREENING FOR COLORECTAL CANCER: ICD-10-CM

## 2024-08-29 DIAGNOSIS — Z12.11 SCREENING FOR COLORECTAL CANCER: ICD-10-CM

## 2024-08-29 DIAGNOSIS — Z12.31 SCREENING MAMMOGRAM FOR BREAST CANCER: ICD-10-CM

## 2024-08-29 DIAGNOSIS — G25.81 RESTLESS LEG SYNDROME: ICD-10-CM

## 2024-08-29 DIAGNOSIS — Z91.81 AT HIGH RISK FOR FALLS: ICD-10-CM

## 2024-08-29 DIAGNOSIS — E66.09 CLASS 1 OBESITY DUE TO EXCESS CALORIES WITH SERIOUS COMORBIDITY AND BODY MASS INDEX (BMI) OF 34.0 TO 34.9 IN ADULT: ICD-10-CM

## 2024-08-29 DIAGNOSIS — E55.9 VITAMIN D DEFICIENCY: ICD-10-CM

## 2024-08-29 DIAGNOSIS — D64.9 NORMOCYTIC ANEMIA: ICD-10-CM

## 2024-08-29 DIAGNOSIS — E78.1 HYPERTRIGLYCERIDEMIA: ICD-10-CM

## 2024-08-29 DIAGNOSIS — N18.31 CHRONIC KIDNEY DISEASE, STAGE 3A (HCC): ICD-10-CM

## 2024-08-29 DIAGNOSIS — Z95.0 S/P PLACEMENT OF CARDIAC PACEMAKER: ICD-10-CM

## 2024-08-29 PROBLEM — E66.9 CLASS 1 OBESITY WITH SERIOUS COMORBIDITY AND BODY MASS INDEX (BMI) OF 34.0 TO 34.9 IN ADULT: Status: ACTIVE | Noted: 2024-02-29

## 2024-08-29 PROBLEM — F32.A DEPRESSION: Status: RESOLVED | Noted: 2022-03-22 | Resolved: 2024-08-29

## 2024-08-29 PROBLEM — E66.811 CLASS 1 OBESITY WITH SERIOUS COMORBIDITY AND BODY MASS INDEX (BMI) OF 34.0 TO 34.9 IN ADULT: Status: ACTIVE | Noted: 2024-02-29

## 2024-08-29 LAB
CHOLESTEROL, FASTING: 175 MG/DL
FERRITIN: 128 NG/ML
FOLATE: >20 NG/ML (ref 4.8–24.2)
HCT VFR BLD CALC: 36.2 % (ref 34–48)
HDLC SERPL-MCNC: 56 MG/DL
HEMOGLOBIN: 10.8 G/DL (ref 11.5–15.5)
IRON % SATURATION: 15 % (ref 15–50)
IRON: 43 UG/DL (ref 37–145)
LDL CHOLESTEROL: 101 MG/DL
MCH RBC QN AUTO: 26.2 PG (ref 26–35)
MCHC RBC AUTO-ENTMCNC: 29.8 G/DL (ref 32–34.5)
MCV RBC AUTO: 87.7 FL (ref 80–99.9)
PDW BLD-RTO: 15 % (ref 11.5–15)
PLATELET # BLD: 207 K/UL (ref 130–450)
PMV BLD AUTO: 10 FL (ref 7–12)
RBC # BLD: 4.13 M/UL (ref 3.5–5.5)
TOTAL IRON BINDING CAPACITY: 278 UG/DL (ref 250–450)
TRIGLYCERIDE, FASTING: 92 MG/DL
VITAMIN B-12: 814 PG/ML (ref 211–946)
VITAMIN D 25-HYDROXY: 49.3 NG/ML (ref 30–100)
VLDLC SERPL CALC-MCNC: 18 MG/DL
WBC # BLD: 6.5 K/UL (ref 4.5–11.5)

## 2024-08-29 RX ORDER — ALBUTEROL SULFATE 90 UG/1
2 AEROSOL, METERED RESPIRATORY (INHALATION) 4 TIMES DAILY PRN
Qty: 18 G | Refills: 5 | Status: SHIPPED | OUTPATIENT
Start: 2024-08-29

## 2024-08-29 RX ORDER — DILTIAZEM HYDROCHLORIDE 60 MG/1
2 TABLET, FILM COATED ORAL 2 TIMES DAILY
Qty: 10.2 G | Refills: 5 | Status: SHIPPED
Start: 2024-08-29 | End: 2024-09-05

## 2024-08-29 RX ORDER — LOSARTAN POTASSIUM 100 MG/1
100 TABLET ORAL DAILY
Qty: 30 TABLET | Refills: 5 | Status: SHIPPED | OUTPATIENT
Start: 2024-08-29

## 2024-08-29 RX ORDER — AMLODIPINE BESYLATE 10 MG/1
10 TABLET ORAL DAILY
Qty: 30 TABLET | Refills: 5 | Status: SHIPPED | OUTPATIENT
Start: 2024-08-29

## 2024-08-29 RX ORDER — ERGOCALCIFEROL 1.25 MG/1
50000 CAPSULE, LIQUID FILLED ORAL WEEKLY
Qty: 4 CAPSULE | Refills: 5 | Status: SHIPPED | OUTPATIENT
Start: 2024-08-29

## 2024-08-29 RX ORDER — GABAPENTIN 100 MG/1
100 CAPSULE ORAL NIGHTLY
Qty: 30 CAPSULE | Refills: 0 | Status: SHIPPED | OUTPATIENT
Start: 2024-08-29 | End: 2024-09-28

## 2024-08-29 NOTE — PROGRESS NOTES
Lab draw Left AC 22 x 1 1/4\". Venipuncture x 1  
Left lower leg: No edema.      Comments: Timed Up and Go Test 9 seconds. Steady gait.   Skin:     General: Skin is warm and dry.   Neurological:      Mental Status: She is alert and oriented to person, place, and time.   Psychiatric:         Mood and Affect: Mood normal.         Behavior: Behavior normal.       Testing:     Orders Placed This Encounter   Procedures    USAMA CHRISTIANO DIGITAL SCREEN BILATERAL PER PROTOCOL     Further imaging can be completed per [facility] protocol     Standing Status:   Future     Standing Expiration Date:   10/29/2025    Influenza, FLUAD Trivalent, (age 65 y+), IM, Preservative Free, 0.5mL    External Referral To General Surgery     Referral Priority:   Routine     Referral Type:   Eval and Treat     Referral Reason:   Specialty Services Required     Requested Specialty:   General Surgery     Number of Visits Requested:   1      No results found for this or any previous visit (from the past 24 hour(s)).

## 2024-08-30 LAB — HBA1C MFR BLD: 5.8 % (ref 4–5.6)

## 2024-09-03 ENCOUNTER — ANTI-COAG VISIT (OUTPATIENT)
Dept: PRIMARY CARE CLINIC | Age: 64
End: 2024-09-03

## 2024-09-03 ENCOUNTER — TELEPHONE (OUTPATIENT)
Dept: PRIMARY CARE CLINIC | Age: 64
End: 2024-09-03

## 2024-09-03 NOTE — TELEPHONE ENCOUNTER
Pt called to ask the doctor if pt should be seen because of incision is sticking up 1/2 inch from the body and pt's last one did not. No Redness or bleeding. No Pain. Pt was checking to see if she should be checked by Dr. Swift.  Consulted Dr. Swift. Directed Pt to speak to surgeon to see if this is normal.

## 2024-09-05 RX ORDER — BUDESONIDE AND FORMOTEROL FUMARATE DIHYDRATE 80; 4.5 UG/1; UG/1
2 AEROSOL RESPIRATORY (INHALATION) 2 TIMES DAILY
Qty: 10.2 G | Refills: 5 | Status: SHIPPED | OUTPATIENT
Start: 2024-09-05

## 2024-09-06 ASSESSMENT — ENCOUNTER SYMPTOMS
SHORTNESS OF BREATH: 0
DIARRHEA: 0
BACK PAIN: 0
BLOOD IN STOOL: 0
COUGH: 0
CONSTIPATION: 0
CHEST TIGHTNESS: 0

## 2024-09-06 NOTE — ASSESSMENT & PLAN NOTE
Chronic, stable  S/p emergency pacemaker 3/24/2022 at [facility]  Lead replaced 8/26/2024   Follows with cardiology/electrophysiology  Regular rate and rhythm today

## 2024-10-06 DIAGNOSIS — G25.81 RESTLESS LEG SYNDROME: ICD-10-CM

## 2024-10-07 RX ORDER — GABAPENTIN 100 MG/1
100 CAPSULE ORAL NIGHTLY
Qty: 30 CAPSULE | Refills: 5 | Status: SHIPPED | OUTPATIENT
Start: 2024-10-07 | End: 2025-04-05

## 2024-10-07 NOTE — TELEPHONE ENCOUNTER
Patients last appointment 8/29/2024.  Patients next scheduled appointment   Future Appointments   Date Time Provider Department Center   3/6/2025  8:00 AM Spencer Swift MD [facility]

## 2025-02-16 DIAGNOSIS — G25.81 RESTLESS LEG SYNDROME: ICD-10-CM

## 2025-02-16 DIAGNOSIS — Z87.892 HISTORY OF ANAPHYLAXIS: ICD-10-CM

## 2025-02-16 DIAGNOSIS — E55.9 VITAMIN D DEFICIENCY: ICD-10-CM

## 2025-02-16 DIAGNOSIS — J45.40 MODERATE PERSISTENT ASTHMA WITHOUT COMPLICATION: ICD-10-CM

## 2025-02-16 DIAGNOSIS — Z91.030 ALLERGY TO HONEY BEE VENOM: ICD-10-CM

## 2025-02-16 DIAGNOSIS — I10 PRIMARY HYPERTENSION: ICD-10-CM

## 2025-02-17 RX ORDER — ALBUTEROL SULFATE 90 UG/1
2 INHALANT RESPIRATORY (INHALATION) 4 TIMES DAILY PRN
Qty: 18 G | Refills: 5 | Status: SHIPPED | OUTPATIENT
Start: 2025-02-17

## 2025-02-17 RX ORDER — GABAPENTIN 100 MG/1
100 CAPSULE ORAL NIGHTLY
Qty: 30 CAPSULE | Refills: 5 | Status: SHIPPED | OUTPATIENT
Start: 2025-02-17 | End: 2025-08-16

## 2025-02-17 RX ORDER — LOSARTAN POTASSIUM 100 MG/1
100 TABLET ORAL DAILY
Qty: 30 TABLET | Refills: 5 | Status: SHIPPED | OUTPATIENT
Start: 2025-02-17

## 2025-02-17 RX ORDER — ERGOCALCIFEROL 1.25 MG/1
50000 CAPSULE, LIQUID FILLED ORAL WEEKLY
Qty: 4 CAPSULE | Refills: 5 | Status: SHIPPED | OUTPATIENT
Start: 2025-02-17

## 2025-02-17 RX ORDER — AMLODIPINE BESYLATE 10 MG/1
10 TABLET ORAL DAILY
Qty: 30 TABLET | Refills: 5 | Status: SHIPPED | OUTPATIENT
Start: 2025-02-17

## 2025-02-17 RX ORDER — EPINEPHRINE 0.3 MG/.3ML
0.3 INJECTION SUBCUTANEOUS PRN
Qty: 2 EACH | Refills: 1 | Status: SHIPPED | OUTPATIENT
Start: 2025-02-17

## 2025-02-17 RX ORDER — BUDESONIDE AND FORMOTEROL FUMARATE DIHYDRATE 80; 4.5 UG/1; UG/1
2 AEROSOL RESPIRATORY (INHALATION) 2 TIMES DAILY
Qty: 10.2 G | Refills: 5 | Status: SHIPPED | OUTPATIENT
Start: 2025-02-17

## 2025-02-17 NOTE — TELEPHONE ENCOUNTER
Name of Medication(s) Requested:  Requested Prescriptions     Pending Prescriptions Disp Refills    EPINEPHrine (EPIPEN 2-ANN) 0.3 MG/0.3ML SOAJ injection 2 each 1     Sig: Inject 0.3 mLs into the muscle as needed (anaphylaxis) Use as directed for allergic reaction    albuterol sulfate HFA (VENTOLIN HFA) 108 (90 Base) MCG/ACT inhaler 18 g 5     Sig: Inhale 2 puffs into the lungs 4 times daily as needed for Wheezing    amLODIPine (NORVASC) 10 MG tablet 30 tablet 5     Sig: Take 1 tablet by mouth daily    losartan (COZAAR) 100 MG tablet 30 tablet 5     Sig: Take 1 tablet by mouth daily    vitamin D (ERGOCALCIFEROL) 1.25 MG (22795 UT) CAPS capsule 4 capsule 5     Sig: Take 1 capsule by mouth once a week    budesonide-formoterol (SYMBICORT) 80-4.5 MCG/ACT AERO 10.2 g 5     Sig: Inhale 2 puffs into the lungs 2 times daily    gabapentin (NEURONTIN) 100 MG capsule 30 capsule 5     Sig: Take 1 capsule by mouth nightly for 180 days. Intended supply: 30 days       Medication is on current medication list Yes    Dosage and directions were verified? Yes    Quantity verified: 90 day supply     Pharmacy Verified?  Yes    Last Appointment:  8/29/2024    Future appts:  Future Appointments   Date Time Provider Department Center   3/11/2025  2:00 PM Spencer Swift MD [facility]        (If no appt send self scheduling link. .REFILLAPPT)  Scheduling request sent?     [] Yes  [x] No    Does patient need updated?  [] Yes  [x] No

## 2025-03-11 ENCOUNTER — OFFICE VISIT (OUTPATIENT)
Dept: PRIMARY CARE CLINIC | Age: 65
End: 2025-03-11
Payer: COMMERCIAL

## 2025-03-11 VITALS
WEIGHT: 240 LBS | TEMPERATURE: 97 F | SYSTOLIC BLOOD PRESSURE: 122 MMHG | RESPIRATION RATE: 17 BRPM | HEART RATE: 70 BPM | DIASTOLIC BLOOD PRESSURE: 80 MMHG | HEIGHT: 70 IN | BODY MASS INDEX: 34.36 KG/M2 | OXYGEN SATURATION: 96 %

## 2025-03-11 DIAGNOSIS — D64.9 NORMOCYTIC ANEMIA: ICD-10-CM

## 2025-03-11 DIAGNOSIS — Z13.820 SCREENING FOR OSTEOPOROSIS: ICD-10-CM

## 2025-03-11 DIAGNOSIS — Z78.0 ASYMPTOMATIC MENOPAUSE: ICD-10-CM

## 2025-03-11 DIAGNOSIS — N18.31 CHRONIC KIDNEY DISEASE, STAGE 3A (HCC): ICD-10-CM

## 2025-03-11 DIAGNOSIS — I44.2 THIRD DEGREE AV BLOCK (HCC): Primary | ICD-10-CM

## 2025-03-11 DIAGNOSIS — R73.03 PREDIABETES: ICD-10-CM

## 2025-03-11 PROBLEM — R92.8 ABNORMALITY OF RIGHT BREAST ON SCREENING MAMMOGRAM: Status: RESOLVED | Noted: 2024-01-16 | Resolved: 2025-03-11

## 2025-03-11 LAB
HBA1C MFR BLD: 5.8 % (ref 4–5.6)
HCT VFR BLD CALC: 32.9 % (ref 34–48)
HEMOGLOBIN: 10.2 G/DL (ref 11.5–15.5)
MCH RBC QN AUTO: 26.8 PG (ref 26–35)
MCHC RBC AUTO-ENTMCNC: 31 G/DL (ref 32–34.5)
MCV RBC AUTO: 86.4 FL (ref 80–99.9)
PDW BLD-RTO: 15.5 % (ref 11.5–15)
PLATELET # BLD: 222 K/UL (ref 130–450)
PMV BLD AUTO: 9.8 FL (ref 7–12)
RBC # BLD: 3.81 M/UL (ref 3.5–5.5)
WBC # BLD: 7 K/UL (ref 4.5–11.5)

## 2025-03-11 PROCEDURE — 3079F DIAST BP 80-89 MM HG: CPT | Performed by: STUDENT IN AN ORGANIZED HEALTH CARE EDUCATION/TRAINING PROGRAM

## 2025-03-11 PROCEDURE — 3074F SYST BP LT 130 MM HG: CPT | Performed by: STUDENT IN AN ORGANIZED HEALTH CARE EDUCATION/TRAINING PROGRAM

## 2025-03-11 PROCEDURE — 99214 OFFICE O/P EST MOD 30 MIN: CPT | Performed by: STUDENT IN AN ORGANIZED HEALTH CARE EDUCATION/TRAINING PROGRAM

## 2025-03-11 PROCEDURE — 1123F ACP DISCUSS/DSCN MKR DOCD: CPT | Performed by: STUDENT IN AN ORGANIZED HEALTH CARE EDUCATION/TRAINING PROGRAM

## 2025-03-11 PROCEDURE — G2211 COMPLEX E/M VISIT ADD ON: HCPCS | Performed by: STUDENT IN AN ORGANIZED HEALTH CARE EDUCATION/TRAINING PROGRAM

## 2025-03-11 SDOH — ECONOMIC STABILITY: FOOD INSECURITY: WITHIN THE PAST 12 MONTHS, THE FOOD YOU BOUGHT JUST DIDN'T LAST AND YOU DIDN'T HAVE MONEY TO GET MORE.: NEVER TRUE

## 2025-03-11 SDOH — ECONOMIC STABILITY: FOOD INSECURITY: WITHIN THE PAST 12 MONTHS, YOU WORRIED THAT YOUR FOOD WOULD RUN OUT BEFORE YOU GOT MONEY TO BUY MORE.: NEVER TRUE

## 2025-03-11 ASSESSMENT — PATIENT HEALTH QUESTIONNAIRE - PHQ9
SUM OF ALL RESPONSES TO PHQ QUESTIONS 1-9: 0
2. FEELING DOWN, DEPRESSED OR HOPELESS: NOT AT ALL
1. LITTLE INTEREST OR PLEASURE IN DOING THINGS: NOT AT ALL
SUM OF ALL RESPONSES TO PHQ QUESTIONS 1-9: 0

## 2025-03-11 NOTE — ASSESSMENT & PLAN NOTE
Unclear etiology  Only mild CKD 2  B12 and iron normal  Recommended colonoscopy but declines  Planning Cologuard  Recheck

## 2025-03-11 NOTE — PROGRESS NOTES
Lab draw Right AC 22 x 1 1/4\". Venipuncture x 1  
   AACH    Hemoglobin A1C     Standing Status:   Future     Expected Date:   3/11/2025     Expiration Date:   3/11/2026    CBC     Standing Status:   Future     Expected Date:   3/11/2025     Expiration Date:   3/11/2026      No results found for this or any previous visit (from the past 24 hours).

## 2025-03-11 NOTE — ASSESSMENT & PLAN NOTE
Chronic, stable  S/p emergency pacemaker 3/24/2022 at [facility]  Lead replaced 8/26/2024   Follows with cardiology/electrophysiology  Regular rate and rhythm today

## 2025-03-20 ENCOUNTER — RESULTS FOLLOW-UP (OUTPATIENT)
Dept: PRIMARY CARE CLINIC | Age: 65
End: 2025-03-20

## 2025-04-07 ENCOUNTER — HOSPITAL ENCOUNTER (OUTPATIENT)
Dept: MAMMOGRAPHY | Age: 65
Discharge: HOME | End: 2025-04-09

## 2025-04-07 DIAGNOSIS — Z12.31 SCREENING MAMMOGRAM FOR BREAST CANCER: ICD-10-CM

## 2025-06-01 DIAGNOSIS — G25.81 RESTLESS LEG SYNDROME: ICD-10-CM

## 2025-06-02 NOTE — TELEPHONE ENCOUNTER
Name of Medication(s) Requested:  Requested Prescriptions     Pending Prescriptions Disp Refills    gabapentin (NEURONTIN) 100 MG capsule 30 capsule 5     Sig: Take 1 capsule by mouth nightly for 180 days. Intended supply: 30 days       Medication is on current medication list Yes    Dosage and directions were verified? Yes    Quantity verified: 90 day supply     Pharmacy Verified?  Yes    Last Appointment:  Visit date not found    Future appts:  Future Appointments   Date Time Provider Department Center   9/9/2025  2:00 PM Spencer Swift MD [facility] DEP        (If no appt send self scheduling link. .REFILLAPPT)  Scheduling request sent?     [] Yes  [x] No    Does patient need updated?  [] Yes  [] No

## 2025-06-09 RX ORDER — GABAPENTIN 100 MG/1
100 CAPSULE ORAL NIGHTLY
Qty: 30 CAPSULE | Refills: 3 | Status: SHIPPED | OUTPATIENT
Start: 2025-06-09 | End: 2025-10-07

## 2025-09-03 DIAGNOSIS — E55.9 VITAMIN D DEFICIENCY: ICD-10-CM

## 2025-09-04 RX ORDER — ERGOCALCIFEROL 1.25 MG/1
50000 CAPSULE, LIQUID FILLED ORAL WEEKLY
Qty: 4 CAPSULE | Refills: 0 | Status: SHIPPED | OUTPATIENT
Start: 2025-09-04